# Patient Record
Sex: MALE | Race: BLACK OR AFRICAN AMERICAN | NOT HISPANIC OR LATINO | Employment: OTHER | URBAN - METROPOLITAN AREA
[De-identification: names, ages, dates, MRNs, and addresses within clinical notes are randomized per-mention and may not be internally consistent; named-entity substitution may affect disease eponyms.]

---

## 2018-03-26 LAB
ALBUMIN SERPL-MCNC: 4.3 G/DL (ref 3.2–4.8)
ALBUMIN/GLOB SERPL: 1 G/DL (ref 1.5–2.5)
ALP SERPL-CCNC: 79 U/L (ref 25–100)
ALT SERPL W P-5'-P-CCNC: 13 U/L (ref 7–40)
ANION GAP SERPL CALCULATED.3IONS-SCNC: 16 MMOL/L (ref 3–11)
AST SERPL-CCNC: 11 U/L (ref 0–33)
BASOPHILS # BLD AUTO: 0.04 10*3/MM3 (ref 0–0.2)
BASOPHILS NFR BLD AUTO: 0.3 % (ref 0–1)
BILIRUB SERPL-MCNC: 1.4 MG/DL (ref 0.3–1.2)
BUN BLD-MCNC: 13 MG/DL (ref 9–23)
BUN/CREAT SERPL: 6.8 (ref 7–25)
CALCIUM SPEC-SCNC: 10.5 MG/DL (ref 8.7–10.4)
CHLORIDE SERPL-SCNC: 95 MMOL/L (ref 99–109)
CO2 SERPL-SCNC: 20 MMOL/L (ref 20–31)
CREAT BLD-MCNC: 1.9 MG/DL (ref 0.6–1.3)
DEPRECATED RDW RBC AUTO: 47.6 FL (ref 37–54)
EOSINOPHIL # BLD AUTO: 0.01 10*3/MM3 (ref 0–0.3)
EOSINOPHIL NFR BLD AUTO: 0.1 % (ref 0–3)
ERYTHROCYTE [DISTWIDTH] IN BLOOD BY AUTOMATED COUNT: 15.7 % (ref 11.3–14.5)
GFR SERPL CREATININE-BSD FRML MDRD: 45 ML/MIN/1.73
GLOBULIN UR ELPH-MCNC: 4.1 GM/DL
GLUCOSE BLD-MCNC: 176 MG/DL (ref 70–100)
HCT VFR BLD AUTO: 46.2 % (ref 38.9–50.9)
HGB BLD-MCNC: 15.2 G/DL (ref 13.1–17.5)
HOLD SPECIMEN: NORMAL
HOLD SPECIMEN: NORMAL
IMM GRANULOCYTES # BLD: 0.11 10*3/MM3 (ref 0–0.03)
IMM GRANULOCYTES NFR BLD: 0.8 % (ref 0–0.6)
LIPASE SERPL-CCNC: 23 U/L (ref 6–51)
LYMPHOCYTES # BLD AUTO: 1.97 10*3/MM3 (ref 0.6–4.8)
LYMPHOCYTES NFR BLD AUTO: 14 % (ref 24–44)
MCH RBC QN AUTO: 27.3 PG (ref 27–31)
MCHC RBC AUTO-ENTMCNC: 32.9 G/DL (ref 32–36)
MCV RBC AUTO: 82.9 FL (ref 80–99)
MONOCYTES # BLD AUTO: 1.13 10*3/MM3 (ref 0–1)
MONOCYTES NFR BLD AUTO: 8 % (ref 0–12)
NEUTROPHILS # BLD AUTO: 10.79 10*3/MM3 (ref 1.5–8.3)
NEUTROPHILS NFR BLD AUTO: 76.8 % (ref 41–71)
PLATELET # BLD AUTO: 174 10*3/MM3 (ref 150–450)
PMV BLD AUTO: 9.6 FL (ref 6–12)
POTASSIUM BLD-SCNC: 4.2 MMOL/L (ref 3.5–5.5)
PROT SERPL-MCNC: 8.4 G/DL (ref 5.7–8.2)
RBC # BLD AUTO: 5.57 10*6/MM3 (ref 4.2–5.76)
SODIUM BLD-SCNC: 131 MMOL/L (ref 132–146)
WBC NRBC COR # BLD: 14.05 10*3/MM3 (ref 3.5–10.8)
WHOLE BLOOD HOLD SPECIMEN: NORMAL
WHOLE BLOOD HOLD SPECIMEN: NORMAL

## 2018-03-26 PROCEDURE — 96361 HYDRATE IV INFUSION ADD-ON: CPT

## 2018-03-26 PROCEDURE — 85025 COMPLETE CBC W/AUTO DIFF WBC: CPT | Performed by: EMERGENCY MEDICINE

## 2018-03-26 PROCEDURE — 83690 ASSAY OF LIPASE: CPT | Performed by: EMERGENCY MEDICINE

## 2018-03-26 PROCEDURE — 80053 COMPREHEN METABOLIC PANEL: CPT | Performed by: EMERGENCY MEDICINE

## 2018-03-26 PROCEDURE — 99284 EMERGENCY DEPT VISIT MOD MDM: CPT

## 2018-03-26 PROCEDURE — 96376 TX/PRO/DX INJ SAME DRUG ADON: CPT

## 2018-03-26 PROCEDURE — 96375 TX/PRO/DX INJ NEW DRUG ADDON: CPT

## 2018-03-26 PROCEDURE — 96374 THER/PROPH/DIAG INJ IV PUSH: CPT

## 2018-03-26 RX ORDER — SODIUM CHLORIDE 0.9 % (FLUSH) 0.9 %
10 SYRINGE (ML) INJECTION AS NEEDED
Status: DISCONTINUED | OUTPATIENT
Start: 2018-03-26 | End: 2018-03-29 | Stop reason: HOSPADM

## 2018-03-27 ENCOUNTER — HOSPITAL ENCOUNTER (OUTPATIENT)
Facility: HOSPITAL | Age: 58
Setting detail: OBSERVATION
Discharge: HOME | End: 2018-03-29
Attending: EMERGENCY MEDICINE | Admitting: INTERNAL MEDICINE

## 2018-03-27 ENCOUNTER — APPOINTMENT (OUTPATIENT)
Dept: CT IMAGING | Facility: HOSPITAL | Age: 58
End: 2018-03-27

## 2018-03-27 DIAGNOSIS — R11.2 NON-INTRACTABLE VOMITING WITH NAUSEA, UNSPECIFIED VOMITING TYPE: ICD-10-CM

## 2018-03-27 DIAGNOSIS — N17.9 AKI (ACUTE KIDNEY INJURY) (HCC): Primary | ICD-10-CM

## 2018-03-27 DIAGNOSIS — M1A.09X0 CHRONIC GOUT OF MULTIPLE SITES, UNSPECIFIED CAUSE: ICD-10-CM

## 2018-03-27 PROBLEM — R10.9 ABDOMINAL PAIN: Status: ACTIVE | Noted: 2018-03-27

## 2018-03-27 PROBLEM — R73.9 HYPERGLYCEMIA: Status: ACTIVE | Noted: 2018-03-27

## 2018-03-27 PROBLEM — D72.829 LEUKOCYTOSIS: Status: ACTIVE | Noted: 2018-03-27

## 2018-03-27 LAB
ALBUMIN SERPL-MCNC: 4 G/DL (ref 3.2–4.8)
ALBUMIN/GLOB SERPL: 1.1 G/DL (ref 1.5–2.5)
ALP SERPL-CCNC: 64 U/L (ref 25–100)
ALT SERPL W P-5'-P-CCNC: 15 U/L (ref 7–40)
ANION GAP SERPL CALCULATED.3IONS-SCNC: 10 MMOL/L (ref 3–11)
AST SERPL-CCNC: 11 U/L (ref 0–33)
BACTERIA UR QL AUTO: ABNORMAL /HPF
BASOPHILS # BLD AUTO: 0.03 10*3/MM3 (ref 0–0.2)
BASOPHILS NFR BLD AUTO: 0.2 % (ref 0–1)
BILIRUB SERPL-MCNC: 1 MG/DL (ref 0.3–1.2)
BILIRUB UR QL STRIP: NEGATIVE
BUN BLD-MCNC: 14 MG/DL (ref 9–23)
BUN/CREAT SERPL: 7.8 (ref 7–25)
CALCIUM SPEC-SCNC: 9.4 MG/DL (ref 8.7–10.4)
CHLORIDE SERPL-SCNC: 98 MMOL/L (ref 99–109)
CLARITY UR: ABNORMAL
CO2 SERPL-SCNC: 24 MMOL/L (ref 20–31)
COLOR UR: YELLOW
CREAT BLD-MCNC: 1.8 MG/DL (ref 0.6–1.3)
D-LACTATE SERPL-SCNC: 0.9 MMOL/L (ref 0.5–2)
D-LACTATE SERPL-SCNC: 2.3 MMOL/L (ref 0.5–2)
DEPRECATED RDW RBC AUTO: 48.5 FL (ref 37–54)
EOSINOPHIL # BLD AUTO: 0.13 10*3/MM3 (ref 0–0.3)
EOSINOPHIL NFR BLD AUTO: 1.1 % (ref 0–3)
ERYTHROCYTE [DISTWIDTH] IN BLOOD BY AUTOMATED COUNT: 15.7 % (ref 11.3–14.5)
GFR SERPL CREATININE-BSD FRML MDRD: 47 ML/MIN/1.73
GLOBULIN UR ELPH-MCNC: 3.5 GM/DL
GLUCOSE BLD-MCNC: 121 MG/DL (ref 70–100)
GLUCOSE UR STRIP-MCNC: NEGATIVE MG/DL
HCT VFR BLD AUTO: 42.1 % (ref 38.9–50.9)
HGB BLD-MCNC: 13.1 G/DL (ref 13.1–17.5)
HGB UR QL STRIP.AUTO: ABNORMAL
HOLD SPECIMEN: NORMAL
HYALINE CASTS UR QL AUTO: ABNORMAL /LPF
IMM GRANULOCYTES # BLD: 0.08 10*3/MM3 (ref 0–0.03)
IMM GRANULOCYTES NFR BLD: 0.7 % (ref 0–0.6)
KETONES UR QL STRIP: ABNORMAL
LEUKOCYTE ESTERASE UR QL STRIP.AUTO: NEGATIVE
LYMPHOCYTES # BLD AUTO: 2.83 10*3/MM3 (ref 0.6–4.8)
LYMPHOCYTES NFR BLD AUTO: 23 % (ref 24–44)
MCH RBC QN AUTO: 26.2 PG (ref 27–31)
MCHC RBC AUTO-ENTMCNC: 31.1 G/DL (ref 32–36)
MCV RBC AUTO: 84.2 FL (ref 80–99)
MONOCYTES # BLD AUTO: 1.25 10*3/MM3 (ref 0–1)
MONOCYTES NFR BLD AUTO: 10.2 % (ref 0–12)
NEUTROPHILS # BLD AUTO: 7.97 10*3/MM3 (ref 1.5–8.3)
NEUTROPHILS NFR BLD AUTO: 64.8 % (ref 41–71)
NITRITE UR QL STRIP: NEGATIVE
PH UR STRIP.AUTO: 5.5 [PH] (ref 5–8)
PLATELET # BLD AUTO: 172 10*3/MM3 (ref 150–450)
PMV BLD AUTO: 10.2 FL (ref 6–12)
POTASSIUM BLD-SCNC: 3.9 MMOL/L (ref 3.5–5.5)
PROT SERPL-MCNC: 7.5 G/DL (ref 5.7–8.2)
PROT UR QL STRIP: ABNORMAL
RBC # BLD AUTO: 5 10*6/MM3 (ref 4.2–5.76)
RBC # UR: ABNORMAL /HPF
REF LAB TEST METHOD: ABNORMAL
RENAL EPI CELLS #/AREA URNS HPF: ABNORMAL /HPF
SODIUM BLD-SCNC: 132 MMOL/L (ref 132–146)
SP GR UR STRIP: 1.01 (ref 1–1.03)
SQUAMOUS #/AREA URNS HPF: ABNORMAL /HPF
TRANS CELLS #/AREA URNS HPF: ABNORMAL /HPF
TROPONIN I SERPL-MCNC: 0 NG/ML (ref 0–0.07)
UROBILINOGEN UR QL STRIP: ABNORMAL
WBC NRBC COR # BLD: 12.29 10*3/MM3 (ref 3.5–10.8)
WBC UR QL AUTO: ABNORMAL /HPF

## 2018-03-27 PROCEDURE — 87086 URINE CULTURE/COLONY COUNT: CPT | Performed by: EMERGENCY MEDICINE

## 2018-03-27 PROCEDURE — 96361 HYDRATE IV INFUSION ADD-ON: CPT

## 2018-03-27 PROCEDURE — 25010000002 FENTANYL CITRATE (PF) 100 MCG/2ML SOLUTION: Performed by: EMERGENCY MEDICINE

## 2018-03-27 PROCEDURE — G0378 HOSPITAL OBSERVATION PER HR: HCPCS

## 2018-03-27 PROCEDURE — 80053 COMPREHEN METABOLIC PANEL: CPT | Performed by: INTERNAL MEDICINE

## 2018-03-27 PROCEDURE — 74176 CT ABD & PELVIS W/O CONTRAST: CPT

## 2018-03-27 PROCEDURE — 84484 ASSAY OF TROPONIN QUANT: CPT

## 2018-03-27 PROCEDURE — 0 DIATRIZOATE MEGLUMINE & SODIUM PER 1 ML: Performed by: EMERGENCY MEDICINE

## 2018-03-27 PROCEDURE — 96375 TX/PRO/DX INJ NEW DRUG ADDON: CPT

## 2018-03-27 PROCEDURE — 25010000002 ONDANSETRON PER 1 MG: Performed by: EMERGENCY MEDICINE

## 2018-03-27 PROCEDURE — 83605 ASSAY OF LACTIC ACID: CPT | Performed by: EMERGENCY MEDICINE

## 2018-03-27 PROCEDURE — 25010000002 ENOXAPARIN PER 10 MG: Performed by: INTERNAL MEDICINE

## 2018-03-27 PROCEDURE — 99220 PR INITIAL OBSERVATION CARE/DAY 70 MINUTES: CPT | Performed by: INTERNAL MEDICINE

## 2018-03-27 PROCEDURE — 96374 THER/PROPH/DIAG INJ IV PUSH: CPT

## 2018-03-27 PROCEDURE — 81001 URINALYSIS AUTO W/SCOPE: CPT | Performed by: EMERGENCY MEDICINE

## 2018-03-27 PROCEDURE — 96372 THER/PROPH/DIAG INJ SC/IM: CPT

## 2018-03-27 PROCEDURE — 85025 COMPLETE CBC W/AUTO DIFF WBC: CPT | Performed by: INTERNAL MEDICINE

## 2018-03-27 PROCEDURE — 63710000001 PREDNISONE PER 1 MG: Performed by: INTERNAL MEDICINE

## 2018-03-27 PROCEDURE — 96376 TX/PRO/DX INJ SAME DRUG ADON: CPT

## 2018-03-27 RX ORDER — SODIUM CHLORIDE 0.9 % (FLUSH) 0.9 %
1-10 SYRINGE (ML) INJECTION AS NEEDED
Status: DISCONTINUED | OUTPATIENT
Start: 2018-03-27 | End: 2018-03-29 | Stop reason: HOSPADM

## 2018-03-27 RX ORDER — FEBUXOSTAT 40 MG/1
40 TABLET, FILM COATED ORAL DAILY
COMMUNITY
End: 2018-12-13 | Stop reason: SDUPTHER

## 2018-03-27 RX ORDER — COLCHICINE 0.6 MG/1
0.6 TABLET ORAL DAILY
Status: DISCONTINUED | OUTPATIENT
Start: 2018-03-27 | End: 2018-03-29 | Stop reason: HOSPADM

## 2018-03-27 RX ORDER — ONDANSETRON 2 MG/ML
4 INJECTION INTRAMUSCULAR; INTRAVENOUS EVERY 6 HOURS PRN
Status: DISCONTINUED | OUTPATIENT
Start: 2018-03-27 | End: 2018-03-29 | Stop reason: HOSPADM

## 2018-03-27 RX ORDER — ACETAMINOPHEN 325 MG/1
650 TABLET ORAL EVERY 4 HOURS PRN
Status: DISCONTINUED | OUTPATIENT
Start: 2018-03-27 | End: 2018-03-29 | Stop reason: HOSPADM

## 2018-03-27 RX ORDER — FENTANYL CITRATE 50 UG/ML
50 INJECTION, SOLUTION INTRAMUSCULAR; INTRAVENOUS
Status: DISCONTINUED | OUTPATIENT
Start: 2018-03-27 | End: 2018-03-29 | Stop reason: HOSPADM

## 2018-03-27 RX ORDER — ONDANSETRON 4 MG/1
4 TABLET, FILM COATED ORAL EVERY 6 HOURS PRN
Status: DISCONTINUED | OUTPATIENT
Start: 2018-03-27 | End: 2018-03-29 | Stop reason: HOSPADM

## 2018-03-27 RX ORDER — PREDNISONE 20 MG/1
20 TABLET ORAL
Status: DISCONTINUED | OUTPATIENT
Start: 2018-03-27 | End: 2018-03-29 | Stop reason: HOSPADM

## 2018-03-27 RX ORDER — PREDNISONE 10 MG/1
10 TABLET ORAL DAILY
COMMUNITY
End: 2018-05-27

## 2018-03-27 RX ORDER — FEBUXOSTAT 40 MG/1
80 TABLET, FILM COATED ORAL DAILY
Status: DISCONTINUED | OUTPATIENT
Start: 2018-03-27 | End: 2018-03-29 | Stop reason: HOSPADM

## 2018-03-27 RX ORDER — HYDROCODONE BITARTRATE AND ACETAMINOPHEN 5; 325 MG/1; MG/1
1 TABLET ORAL EVERY 6 HOURS PRN
Status: DISCONTINUED | OUTPATIENT
Start: 2018-03-27 | End: 2018-03-29 | Stop reason: HOSPADM

## 2018-03-27 RX ORDER — SODIUM CHLORIDE 450 MG/100ML
125 INJECTION, SOLUTION INTRAVENOUS CONTINUOUS
Status: DISCONTINUED | OUTPATIENT
Start: 2018-03-27 | End: 2018-03-29 | Stop reason: HOSPADM

## 2018-03-27 RX ORDER — PREDNISONE 10 MG/1
10 TABLET ORAL DAILY
Status: DISCONTINUED | OUTPATIENT
Start: 2018-03-27 | End: 2018-03-27

## 2018-03-27 RX ORDER — FEBUXOSTAT 40 MG/1
40 TABLET, FILM COATED ORAL DAILY
Status: DISCONTINUED | OUTPATIENT
Start: 2018-03-27 | End: 2018-03-29 | Stop reason: HOSPADM

## 2018-03-27 RX ORDER — ONDANSETRON 2 MG/ML
4 INJECTION INTRAMUSCULAR; INTRAVENOUS ONCE
Status: COMPLETED | OUTPATIENT
Start: 2018-03-27 | End: 2018-03-27

## 2018-03-27 RX ORDER — FEBUXOSTAT 80 MG/1
80 TABLET, FILM COATED ORAL DAILY
COMMUNITY
End: 2018-12-13 | Stop reason: SDUPTHER

## 2018-03-27 RX ORDER — COLCHICINE 0.6 MG/1
0.6 TABLET ORAL EVERY OTHER DAY
COMMUNITY
End: 2018-05-31 | Stop reason: HOSPADM

## 2018-03-27 RX ADMIN — ONDANSETRON 4 MG: 2 INJECTION INTRAMUSCULAR; INTRAVENOUS at 01:21

## 2018-03-27 RX ADMIN — FEBUXOSTAT 80 MG: 40 TABLET ORAL at 11:19

## 2018-03-27 RX ADMIN — PREDNISONE 20 MG: 20 TABLET ORAL at 11:19

## 2018-03-27 RX ADMIN — FENTANYL CITRATE 50 MCG: 50 INJECTION, SOLUTION INTRAMUSCULAR; INTRAVENOUS at 01:22

## 2018-03-27 RX ADMIN — COLCHICINE 0.6 MG: 0.6 TABLET, FILM COATED ORAL at 11:19

## 2018-03-27 RX ADMIN — SODIUM CHLORIDE 1000 ML: 9 INJECTION, SOLUTION INTRAVENOUS at 01:20

## 2018-03-27 RX ADMIN — FENTANYL CITRATE 50 MCG: 50 INJECTION, SOLUTION INTRAMUSCULAR; INTRAVENOUS at 03:26

## 2018-03-27 RX ADMIN — ENOXAPARIN SODIUM 40 MG: 40 INJECTION SUBCUTANEOUS at 11:19

## 2018-03-27 RX ADMIN — DIATRIZOATE MEGLUMINE AND DIATRIZOATE SODIUM 15 ML: 660; 100 LIQUID ORAL; RECTAL at 01:24

## 2018-03-27 RX ADMIN — SODIUM CHLORIDE 125 ML/HR: 4.5 INJECTION, SOLUTION INTRAVENOUS at 07:04

## 2018-03-27 RX ADMIN — SODIUM CHLORIDE 125 ML/HR: 4.5 INJECTION, SOLUTION INTRAVENOUS at 16:01

## 2018-03-27 RX ADMIN — FEBUXOSTAT 40 MG: 40 TABLET ORAL at 13:40

## 2018-03-27 RX ADMIN — HYDROCODONE BITARTRATE AND ACETAMINOPHEN 1 TABLET: 5; 325 TABLET ORAL at 18:38

## 2018-03-28 LAB
ALBUMIN SERPL-MCNC: 3.7 G/DL (ref 3.2–4.8)
ALBUMIN/GLOB SERPL: 1.2 G/DL (ref 1.5–2.5)
ALP SERPL-CCNC: 54 U/L (ref 25–100)
ALT SERPL W P-5'-P-CCNC: 17 U/L (ref 7–40)
ANION GAP SERPL CALCULATED.3IONS-SCNC: 8 MMOL/L (ref 3–11)
AST SERPL-CCNC: 16 U/L (ref 0–33)
BASOPHILS # BLD AUTO: 0.02 10*3/MM3 (ref 0–0.2)
BASOPHILS NFR BLD AUTO: 0.3 % (ref 0–1)
BILIRUB SERPL-MCNC: 0.5 MG/DL (ref 0.3–1.2)
BUN BLD-MCNC: 12 MG/DL (ref 9–23)
BUN/CREAT SERPL: 8.6 (ref 7–25)
CALCIUM SPEC-SCNC: 8.9 MG/DL (ref 8.7–10.4)
CHLORIDE SERPL-SCNC: 100 MMOL/L (ref 99–109)
CO2 SERPL-SCNC: 26 MMOL/L (ref 20–31)
CREAT BLD-MCNC: 1.4 MG/DL (ref 0.6–1.3)
DEPRECATED RDW RBC AUTO: 49 FL (ref 37–54)
EOSINOPHIL # BLD AUTO: 0.11 10*3/MM3 (ref 0–0.3)
EOSINOPHIL NFR BLD AUTO: 1.4 % (ref 0–3)
ERYTHROCYTE [DISTWIDTH] IN BLOOD BY AUTOMATED COUNT: 15.6 % (ref 11.3–14.5)
GFR SERPL CREATININE-BSD FRML MDRD: 63 ML/MIN/1.73
GLOBULIN UR ELPH-MCNC: 3.1 GM/DL
GLUCOSE BLD-MCNC: 94 MG/DL (ref 70–100)
HBA1C MFR BLD: 6.6 % (ref 4.8–5.6)
HCT VFR BLD AUTO: 37.3 % (ref 38.9–50.9)
HGB BLD-MCNC: 11.4 G/DL (ref 13.1–17.5)
IMM GRANULOCYTES # BLD: 0.03 10*3/MM3 (ref 0–0.03)
IMM GRANULOCYTES NFR BLD: 0.4 % (ref 0–0.6)
LYMPHOCYTES # BLD AUTO: 2.02 10*3/MM3 (ref 0.6–4.8)
LYMPHOCYTES NFR BLD AUTO: 25.5 % (ref 24–44)
MCH RBC QN AUTO: 26.1 PG (ref 27–31)
MCHC RBC AUTO-ENTMCNC: 30.6 G/DL (ref 32–36)
MCV RBC AUTO: 85.4 FL (ref 80–99)
MONOCYTES # BLD AUTO: 0.62 10*3/MM3 (ref 0–1)
MONOCYTES NFR BLD AUTO: 7.8 % (ref 0–12)
NEUTROPHILS # BLD AUTO: 5.11 10*3/MM3 (ref 1.5–8.3)
NEUTROPHILS NFR BLD AUTO: 64.6 % (ref 41–71)
PLATELET # BLD AUTO: 169 10*3/MM3 (ref 150–450)
PMV BLD AUTO: 9.9 FL (ref 6–12)
POTASSIUM BLD-SCNC: 4.2 MMOL/L (ref 3.5–5.5)
PROT SERPL-MCNC: 6.8 G/DL (ref 5.7–8.2)
RBC # BLD AUTO: 4.37 10*6/MM3 (ref 4.2–5.76)
SODIUM BLD-SCNC: 134 MMOL/L (ref 132–146)
WBC NRBC COR # BLD: 7.91 10*3/MM3 (ref 3.5–10.8)

## 2018-03-28 PROCEDURE — 80053 COMPREHEN METABOLIC PANEL: CPT | Performed by: INTERNAL MEDICINE

## 2018-03-28 PROCEDURE — 96361 HYDRATE IV INFUSION ADD-ON: CPT

## 2018-03-28 PROCEDURE — 96372 THER/PROPH/DIAG INJ SC/IM: CPT

## 2018-03-28 PROCEDURE — 83036 HEMOGLOBIN GLYCOSYLATED A1C: CPT | Performed by: INTERNAL MEDICINE

## 2018-03-28 PROCEDURE — G0378 HOSPITAL OBSERVATION PER HR: HCPCS

## 2018-03-28 PROCEDURE — 63710000001 PREDNISONE PER 1 MG: Performed by: INTERNAL MEDICINE

## 2018-03-28 PROCEDURE — 85025 COMPLETE CBC W/AUTO DIFF WBC: CPT | Performed by: INTERNAL MEDICINE

## 2018-03-28 PROCEDURE — 25010000002 ENOXAPARIN PER 10 MG: Performed by: INTERNAL MEDICINE

## 2018-03-28 PROCEDURE — 99225 PR SBSQ OBSERVATION CARE/DAY 25 MINUTES: CPT | Performed by: INTERNAL MEDICINE

## 2018-03-28 RX ADMIN — PREDNISONE 20 MG: 20 TABLET ORAL at 08:16

## 2018-03-28 RX ADMIN — COLCHICINE 0.6 MG: 0.6 TABLET, FILM COATED ORAL at 08:16

## 2018-03-28 RX ADMIN — SODIUM CHLORIDE 125 ML/HR: 4.5 INJECTION, SOLUTION INTRAVENOUS at 05:08

## 2018-03-28 RX ADMIN — FEBUXOSTAT 40 MG: 40 TABLET ORAL at 08:17

## 2018-03-28 RX ADMIN — FEBUXOSTAT 80 MG: 40 TABLET ORAL at 08:18

## 2018-03-28 RX ADMIN — ENOXAPARIN SODIUM 40 MG: 40 INJECTION SUBCUTANEOUS at 05:08

## 2018-03-29 VITALS
TEMPERATURE: 98.1 F | DIASTOLIC BLOOD PRESSURE: 88 MMHG | HEART RATE: 77 BPM | HEIGHT: 74 IN | RESPIRATION RATE: 18 BRPM | OXYGEN SATURATION: 96 % | WEIGHT: 260 LBS | BODY MASS INDEX: 33.37 KG/M2 | SYSTOLIC BLOOD PRESSURE: 122 MMHG

## 2018-03-29 LAB — BACTERIA SPEC AEROBE CULT: NORMAL

## 2018-03-29 PROCEDURE — 96372 THER/PROPH/DIAG INJ SC/IM: CPT

## 2018-03-29 PROCEDURE — 63710000001 PREDNISONE PER 1 MG: Performed by: INTERNAL MEDICINE

## 2018-03-29 PROCEDURE — G0378 HOSPITAL OBSERVATION PER HR: HCPCS

## 2018-03-29 PROCEDURE — 99217 PR OBSERVATION CARE DISCHARGE MANAGEMENT: CPT | Performed by: NURSE PRACTITIONER

## 2018-03-29 PROCEDURE — 25010000002 ENOXAPARIN PER 10 MG: Performed by: INTERNAL MEDICINE

## 2018-03-29 RX ADMIN — HYDROCODONE BITARTRATE AND ACETAMINOPHEN 1 TABLET: 5; 325 TABLET ORAL at 07:55

## 2018-03-29 RX ADMIN — ENOXAPARIN SODIUM 40 MG: 40 INJECTION SUBCUTANEOUS at 05:19

## 2018-03-29 RX ADMIN — FEBUXOSTAT 80 MG: 40 TABLET ORAL at 07:51

## 2018-03-29 RX ADMIN — COLCHICINE 0.6 MG: 0.6 TABLET, FILM COATED ORAL at 07:51

## 2018-03-29 RX ADMIN — FEBUXOSTAT 40 MG: 40 TABLET ORAL at 07:53

## 2018-03-29 RX ADMIN — PREDNISONE 20 MG: 20 TABLET ORAL at 07:53

## 2018-03-30 ENCOUNTER — TRANSITIONAL CARE MANAGEMENT TELEPHONE ENCOUNTER (OUTPATIENT)
Dept: INTERNAL MEDICINE | Facility: CLINIC | Age: 58
End: 2018-03-30

## 2018-05-08 ENCOUNTER — OFFICE VISIT (OUTPATIENT)
Dept: INTERNAL MEDICINE | Facility: CLINIC | Age: 58
End: 2018-05-08

## 2018-05-08 VITALS
WEIGHT: 270 LBS | HEART RATE: 117 BPM | OXYGEN SATURATION: 99 % | TEMPERATURE: 97.2 F | SYSTOLIC BLOOD PRESSURE: 148 MMHG | BODY MASS INDEX: 34.65 KG/M2 | RESPIRATION RATE: 22 BRPM | DIASTOLIC BLOOD PRESSURE: 92 MMHG | HEIGHT: 74 IN

## 2018-05-08 DIAGNOSIS — Z00.00 ANNUAL PHYSICAL EXAM: ICD-10-CM

## 2018-05-08 DIAGNOSIS — Z13.89 ENCOUNTER FOR SCREENING FOR OTHER DISORDER: ICD-10-CM

## 2018-05-08 DIAGNOSIS — Z11.59 NEED FOR HEPATITIS C SCREENING TEST: ICD-10-CM

## 2018-05-08 DIAGNOSIS — M1A.09X1 IDIOPATHIC CHRONIC GOUT OF MULTIPLE SITES WITH TOPHUS: ICD-10-CM

## 2018-05-08 DIAGNOSIS — Z12.11 ENCOUNTER FOR SCREENING COLONOSCOPY: ICD-10-CM

## 2018-05-08 DIAGNOSIS — M1A.9XX1 CHRONIC TOPHACEOUS GOUT: Primary | ICD-10-CM

## 2018-05-08 DIAGNOSIS — Z13.220 SCREENING FOR LIPOID DISORDERS: ICD-10-CM

## 2018-05-08 DIAGNOSIS — Z13.29 SCREENING FOR ENDOCRINE DISORDER: ICD-10-CM

## 2018-05-08 PROCEDURE — 99214 OFFICE O/P EST MOD 30 MIN: CPT | Performed by: NURSE PRACTITIONER

## 2018-05-08 PROCEDURE — 36415 COLL VENOUS BLD VENIPUNCTURE: CPT | Performed by: NURSE PRACTITIONER

## 2018-05-08 RX ORDER — OMEPRAZOLE 20 MG/1
20 CAPSULE, DELAYED RELEASE ORAL DAILY
Refills: 0 | COMMUNITY
Start: 2018-05-02 | End: 2018-12-13

## 2018-05-08 NOTE — PROGRESS NOTES
Chief Complaint   Patient presents with   • Gout     pt states cannot get shoe on, very painful, believes may be gout. Also very sick to stomach, c vomiting.       Subjective   Physical and establish care. Patient had cancelled [facility] visit.    History of Present Illness   Physical and establish care. Patient states his PCP retired and he needed new one after being in hospital in 3/2018 and requests referral to rheumatology.  All Collins is a 57 y.o. male.     Are you currently seeing any other doctors or specialists?  None. States he has been in hospital in 3/2018  Are you currently taking any OTC medications or herbal medications?   No  Sleep: 5-6 hours/night  Diet: Whatever he wants  Exercise: No regular exercise    Most recent colonoscopy: 4-5 years ago  Regular dental visits: Not current  Regular eye exams: Not current    The following portions of the patient's history were reviewed and updated as appropriate: allergies, current medications, past family history, past medical history, past social history, past surgical history and problem list.    No Known Allergies  Social History   Substance Use Topics   • Smoking status: Never Smoker   • Smokeless tobacco: Never Used   • Alcohol use Yes      Comment: 3 per week   3 beers  Past Surgical History:   Procedure Laterality Date   • HAND SURGERY     • KNEE SURGERY       No family history on file.      Current Outpatient Prescriptions:   •  colchicine 0.6 MG tablet, Take 0.6 mg by mouth Every Other Day., Disp: , Rfl:   •  febuxostat (ULORIC) 40 MG tablet, Take 40 mg by mouth Daily. Total of 120mg daily, Disp: , Rfl:   •  febuxostat (ULORIC) 80 MG tablet tablet, Take 80 mg by mouth Daily. Total of 120mg daily, Disp: , Rfl:   •  omeprazole (priLOSEC) 20 MG capsule, , Disp: , Rfl: 0  •  predniSONE (DELTASONE) 10 MG tablet, Take 10 mg by mouth Daily. Chronically takes prednisone 10mg daily  Current taper starting 3/26:  20mg daily x 7 days; 15mg daily x 7 days, then  back to baseline dose, Disp: , Rfl:     Patient Active Problem List   Diagnosis   • Abdominal pain   • N&V (nausea and vomiting)   • Leukocytosis   • Hyperglycemia       Review of Systems   Constitutional: Negative.    HENT: Negative.    Eyes: Negative.    Respiratory: Negative.    Cardiovascular: Negative.    Gastrointestinal: Positive for nausea, vomiting and GERD.   Endocrine: Negative.    Genitourinary: Negative.    Musculoskeletal:        Gout since age 18 uncontrolled with large tophi   Skin: Negative.    Allergic/Immunologic: Negative.    Neurological: Negative.    Hematological: Negative.    Psychiatric/Behavioral: Negative.        Objective   Vitals:    05/08/18 1336   BP: 148/92   Pulse: 117   Resp: 22   Temp: 97.2 °F (36.2 °C)   SpO2: 99%     Physical Exam   Constitutional: He is oriented to person, place, and time. He appears well-developed and well-nourished. He is cooperative. He is easily aroused.  Non-toxic appearance. He does not have a sickly appearance. He does not appear ill. No distress. He is obese.  HENT:   Head: Normocephalic and atraumatic. Head is without abrasion. Hair is normal.   Right Ear: Hearing, tympanic membrane, external ear and ear canal normal. No foreign bodies. Tympanic membrane is not perforated and not erythematous.   Left Ear: Hearing, tympanic membrane, external ear and ear canal normal. No foreign bodies. Tympanic membrane is not perforated and not erythematous.   Nose: Nose normal. No mucosal edema, rhinorrhea or septal deviation. No epistaxis.  No foreign bodies.   Mouth/Throat: Oropharynx is clear and moist and mucous membranes are normal. No oral lesions. Normal dentition.   Eyes: Lids are normal. Pupils are equal, round, and reactive to light. Right eye exhibits no discharge. Left eye exhibits no discharge. Right conjunctiva is not injected. Left conjunctiva is not injected. No scleral icterus.   Neck: Normal range of motion and full passive range of motion without  pain. Neck supple. No edema and normal range of motion present. No thyroid mass and no thyromegaly present.   Cardiovascular: Normal rate, regular rhythm and normal heart sounds.  Exam reveals no gallop and no friction rub.    No murmur heard.  Pulmonary/Chest: Effort normal and breath sounds normal. No accessory muscle usage. He has no rhonchi. He has no rales. He exhibits no tenderness.   Abdominal: Soft. Bowel sounds are normal. He exhibits no distension. There is no hepatosplenomegaly or hepatomegaly. There is no tenderness. There is no CVA tenderness.   Musculoskeletal: He exhibits no edema, tenderness or deformity.   Walking with crutches. Unable to bear weight on right foot. Large tophi on bilateral hands, elbows and feet. Hands and feet have deformities with multiple tophi and misalignment of fingers.   Lymphadenopathy:     He has no cervical adenopathy.   Neurological: He is alert, oriented to person, place, and time and easily aroused. He has normal reflexes. No cranial nerve deficit. Coordination normal.   Skin: Skin is warm, dry and intact. No abrasion and no rash noted. He is not diaphoretic. No cyanosis or erythema. Nails show no clubbing.   Psychiatric: His mood appears anxious.   In obvious pain with movement.   Nursing note and vitals reviewed.      Results for orders placed or performed during the hospital encounter of 03/27/18   Urine Culture - Urine, Urine, Clean Catch   Result Value Ref Range    Urine Culture No growth at 2 days    Comprehensive Metabolic Panel   Result Value Ref Range    Glucose 176 (H) 70 - 100 mg/dL    BUN 13 9 - 23 mg/dL    Creatinine 1.90 (H) 0.60 - 1.30 mg/dL    Sodium 131 (L) 132 - 146 mmol/L    Potassium 4.2 3.5 - 5.5 mmol/L    Chloride 95 (L) 99 - 109 mmol/L    CO2 20.0 20.0 - 31.0 mmol/L    Calcium 10.5 (H) 8.7 - 10.4 mg/dL    Total Protein 8.4 (H) 5.7 - 8.2 g/dL    Albumin 4.30 3.20 - 4.80 g/dL    ALT (SGPT) 13 7 - 40 U/L    AST (SGOT) 11 0 - 33 U/L    Alkaline  Phosphatase 79 25 - 100 U/L    Total Bilirubin 1.4 (H) 0.3 - 1.2 mg/dL    eGFR  African Amer 45 (L) >60 mL/min/1.73    Globulin 4.1 gm/dL    A/G Ratio 1.0 (L) 1.5 - 2.5 g/dL    BUN/Creatinine Ratio 6.8 (L) 7.0 - 25.0    Anion Gap 16.0 (H) 3.0 - 11.0 mmol/L   Lipase   Result Value Ref Range    Lipase 23 6 - 51 U/L   Urinalysis With / Culture If Indicated - Urine, Clean Catch   Result Value Ref Range    Color, UA Yellow Yellow, Straw    Appearance, UA Cloudy (A) Clear    pH, UA 5.5 5.0 - 8.0    Specific Gravity, UA 1.012 1.001 - 1.030    Glucose, UA Negative Negative    Ketones, UA Trace (A) Negative    Bilirubin, UA Negative Negative    Blood, UA Moderate (2+) (A) Negative    Protein, UA 30 mg/dL (1+) (A) Negative    Leuk Esterase, UA Negative Negative    Nitrite, UA Negative Negative    Urobilinogen, UA 1.0 E.U./dL 0.2 - 1.0 E.U./dL   CBC Auto Differential   Result Value Ref Range    WBC 14.05 (H) 3.50 - 10.80 10*3/mm3    RBC 5.57 4.20 - 5.76 10*6/mm3    Hemoglobin 15.2 13.1 - 17.5 g/dL    Hematocrit 46.2 38.9 - 50.9 %    MCV 82.9 80.0 - 99.0 fL    MCH 27.3 27.0 - 31.0 pg    MCHC 32.9 32.0 - 36.0 g/dL    RDW 15.7 (H) 11.3 - 14.5 %    RDW-SD 47.6 37.0 - 54.0 fl    MPV 9.6 6.0 - 12.0 fL    Platelets 174 150 - 450 10*3/mm3    Neutrophil % 76.8 (H) 41.0 - 71.0 %    Lymphocyte % 14.0 (L) 24.0 - 44.0 %    Monocyte % 8.0 0.0 - 12.0 %    Eosinophil % 0.1 0.0 - 3.0 %    Basophil % 0.3 0.0 - 1.0 %    Immature Grans % 0.8 (H) 0.0 - 0.6 %    Neutrophils, Absolute 10.79 (H) 1.50 - 8.30 10*3/mm3    Lymphocytes, Absolute 1.97 0.60 - 4.80 10*3/mm3    Monocytes, Absolute 1.13 (H) 0.00 - 1.00 10*3/mm3    Eosinophils, Absolute 0.01 0.00 - 0.30 10*3/mm3    Basophils, Absolute 0.04 0.00 - 0.20 10*3/mm3    Immature Grans, Absolute 0.11 (H) 0.00 - 0.03 10*3/mm3   Lactic Acid, Plasma   Result Value Ref Range    Lactate 2.3 (C) 0.5 - 2.0 mmol/L   Lactic Acid, Reflex Timer (This will reflex a repeat order 3-3:15 hours after ordered.)    Result Value Ref Range    Extra Tube Hold for add-ons.    Urinalysis, Microscopic Only - Urine, Clean Catch   Result Value Ref Range    RBC, UA 13-20 (A) None Seen, 0-2 /HPF    WBC, UA 6-12 (A) None Seen, 0-2 /HPF    Bacteria, UA None Seen None Seen, Trace /HPF    Squamous Epithelial Cells, UA 3-6 (A) None Seen, 0-2 /HPF    Transitional Epithelial Cells, UA 0-2 0 - 2 /HPF    Renal Epithelial Cells, UA 0-2 0 - 2 /HPF    Hyaline Casts, UA 0-6 0 - 6 /LPF    Methodology Manual Light Microscopy    Lactic Acid, Reflex   Result Value Ref Range    Lactate 0.9 0.5 - 2.0 mmol/L   Comprehensive Metabolic Panel   Result Value Ref Range    Glucose 121 (H) 70 - 100 mg/dL    BUN 14 9 - 23 mg/dL    Creatinine 1.80 (H) 0.60 - 1.30 mg/dL    Sodium 132 132 - 146 mmol/L    Potassium 3.9 3.5 - 5.5 mmol/L    Chloride 98 (L) 99 - 109 mmol/L    CO2 24.0 20.0 - 31.0 mmol/L    Calcium 9.4 8.7 - 10.4 mg/dL    Total Protein 7.5 5.7 - 8.2 g/dL    Albumin 4.00 3.20 - 4.80 g/dL    ALT (SGPT) 15 7 - 40 U/L    AST (SGOT) 11 0 - 33 U/L    Alkaline Phosphatase 64 25 - 100 U/L    Total Bilirubin 1.0 0.3 - 1.2 mg/dL    eGFR  African Amer 47 (L) >60 mL/min/1.73    Globulin 3.5 gm/dL    A/G Ratio 1.1 (L) 1.5 - 2.5 g/dL    BUN/Creatinine Ratio 7.8 7.0 - 25.0    Anion Gap 10.0 3.0 - 11.0 mmol/L   CBC Auto Differential   Result Value Ref Range    WBC 12.29 (H) 3.50 - 10.80 10*3/mm3    RBC 5.00 4.20 - 5.76 10*6/mm3    Hemoglobin 13.1 13.1 - 17.5 g/dL    Hematocrit 42.1 38.9 - 50.9 %    MCV 84.2 80.0 - 99.0 fL    MCH 26.2 (L) 27.0 - 31.0 pg    MCHC 31.1 (L) 32.0 - 36.0 g/dL    RDW 15.7 (H) 11.3 - 14.5 %    RDW-SD 48.5 37.0 - 54.0 fl    MPV 10.2 6.0 - 12.0 fL    Platelets 172 150 - 450 10*3/mm3    Neutrophil % 64.8 41.0 - 71.0 %    Lymphocyte % 23.0 (L) 24.0 - 44.0 %    Monocyte % 10.2 0.0 - 12.0 %    Eosinophil % 1.1 0.0 - 3.0 %    Basophil % 0.2 0.0 - 1.0 %    Immature Grans % 0.7 (H) 0.0 - 0.6 %    Neutrophils, Absolute 7.97 1.50 - 8.30 10*3/mm3     Lymphocytes, Absolute 2.83 0.60 - 4.80 10*3/mm3    Monocytes, Absolute 1.25 (H) 0.00 - 1.00 10*3/mm3    Eosinophils, Absolute 0.13 0.00 - 0.30 10*3/mm3    Basophils, Absolute 0.03 0.00 - 0.20 10*3/mm3    Immature Grans, Absolute 0.08 (H) 0.00 - 0.03 10*3/mm3   CBC Auto Differential   Result Value Ref Range    WBC 7.91 3.50 - 10.80 10*3/mm3    RBC 4.37 4.20 - 5.76 10*6/mm3    Hemoglobin 11.4 (L) 13.1 - 17.5 g/dL    Hematocrit 37.3 (L) 38.9 - 50.9 %    MCV 85.4 80.0 - 99.0 fL    MCH 26.1 (L) 27.0 - 31.0 pg    MCHC 30.6 (L) 32.0 - 36.0 g/dL    RDW 15.6 (H) 11.3 - 14.5 %    RDW-SD 49.0 37.0 - 54.0 fl    MPV 9.9 6.0 - 12.0 fL    Platelets 169 150 - 450 10*3/mm3    Neutrophil % 64.6 41.0 - 71.0 %    Lymphocyte % 25.5 24.0 - 44.0 %    Monocyte % 7.8 0.0 - 12.0 %    Eosinophil % 1.4 0.0 - 3.0 %    Basophil % 0.3 0.0 - 1.0 %    Immature Grans % 0.4 0.0 - 0.6 %    Neutrophils, Absolute 5.11 1.50 - 8.30 10*3/mm3    Lymphocytes, Absolute 2.02 0.60 - 4.80 10*3/mm3    Monocytes, Absolute 0.62 0.00 - 1.00 10*3/mm3    Eosinophils, Absolute 0.11 0.00 - 0.30 10*3/mm3    Basophils, Absolute 0.02 0.00 - 0.20 10*3/mm3    Immature Grans, Absolute 0.03 0.00 - 0.03 10*3/mm3   Comprehensive Metabolic Panel   Result Value Ref Range    Glucose 94 70 - 100 mg/dL    BUN 12 9 - 23 mg/dL    Creatinine 1.40 (H) 0.60 - 1.30 mg/dL    Sodium 134 132 - 146 mmol/L    Potassium 4.2 3.5 - 5.5 mmol/L    Chloride 100 99 - 109 mmol/L    CO2 26.0 20.0 - 31.0 mmol/L    Calcium 8.9 8.7 - 10.4 mg/dL    Total Protein 6.8 5.7 - 8.2 g/dL    Albumin 3.70 3.20 - 4.80 g/dL    ALT (SGPT) 17 7 - 40 U/L    AST (SGOT) 16 0 - 33 U/L    Alkaline Phosphatase 54 25 - 100 U/L    Total Bilirubin 0.5 0.3 - 1.2 mg/dL    eGFR  African Amer 63 >60 mL/min/1.73    Globulin 3.1 gm/dL    A/G Ratio 1.2 (L) 1.5 - 2.5 g/dL    BUN/Creatinine Ratio 8.6 7.0 - 25.0    Anion Gap 8.0 3.0 - 11.0 mmol/L   Hemoglobin A1c   Result Value Ref Range    Hemoglobin A1C 6.60 (H) 4.80 - 5.60 %   POC  Troponin, Rapid   Result Value Ref Range    Troponin I 0.00 0.00 - 0.07 ng/mL   Light Blue Top   Result Value Ref Range    Extra Tube hold for add-on    Green Top (Gel)   Result Value Ref Range    Extra Tube Hold for add-ons.    Lavender Top   Result Value Ref Range    Extra Tube hold for add-on    Gold Top - SST   Result Value Ref Range    Extra Tube Hold for add-ons.          Assessment/Plan   All was seen today for gout.    Diagnoses and all orders for this visit:    Chronic tophaceous gout    Screening for endocrine disorder  -     T4, Free  -     TSH    Screening for lipoid disorders  -     Lipid Panel    Encounter for screening for other disorder  -     Comprehensive Metabolic Panel  -     CBC & Differential    Need for hepatitis C screening test  -     Hepatitis C Antibody    Encounter for screening colonoscopy    Idiopathic chronic gout of multiple sites with tophus  -     Rheumatoid Factor  -     Sedimentation Rate  -     Uric Acid  -     Ambulatory Referral to Rheumatology    Annual physical exam  Complains of GERD x 7-8 years. Associated with heartburn. Certain foods-like spices, black pepper worsen symptoms.  States he has seen a rheumatologist in the past for gout but requests new referral to different provider. (Not Dr Emery)  Complains of HTN intermittently for past 2 years. States he was treated in the past and then medicine was stopped because he did not need it. States blood pressure elevated today due to pain.  Patient complains of chronic tophaceous gout since age 18. Associated with pain and multiple tophi, disfigurement of hands and feet.       Patient education discussed during this visit:  - avoidance of texting while driving and the need for wearing seatbelt  - use of sunscreen  - healthy sleep habits and appropriate amount of sleep  - H2O consumption, well-balanced diet  - exercise routine which includes at least 150 minutes of cardio per week + muscle strengthening exercises  -  immunizations including annual flu vaccination      Return in about 3 months (around 8/8/2018).

## 2018-05-10 ENCOUNTER — TELEPHONE (OUTPATIENT)
Dept: INTERNAL MEDICINE | Facility: CLINIC | Age: 58
End: 2018-05-10

## 2018-05-10 LAB
ALBUMIN SERPL-MCNC: 4.7 G/DL (ref 3.2–4.8)
ALBUMIN/GLOB SERPL: 1.3 G/DL (ref 1.5–2.5)
ALP SERPL-CCNC: 72 U/L (ref 25–100)
ALT SERPL-CCNC: 37 U/L (ref 7–40)
AST SERPL-CCNC: 27 U/L (ref 0–33)
BASOPHILS # BLD MANUAL: 0 10*3/MM3 (ref 0–0.2)
BASOPHILS NFR BLD MANUAL: 0 % (ref 0–1)
BILIRUB SERPL-MCNC: 0.6 MG/DL (ref 0.3–1.2)
BUN SERPL-MCNC: 11 MG/DL (ref 9–23)
BUN/CREAT SERPL: 7.3 (ref 7–25)
CALCIUM SERPL-MCNC: 10 MG/DL (ref 8.7–10.4)
CHLORIDE SERPL-SCNC: 100 MMOL/L (ref 99–109)
CHOLEST SERPL-MCNC: 235 MG/DL (ref 0–200)
CO2 SERPL-SCNC: 23 MMOL/L (ref 20–31)
CREAT SERPL-MCNC: 1.5 MG/DL (ref 0.6–1.3)
DIFFERENTIAL COMMENT: ABNORMAL
EOSINOPHIL # BLD MANUAL: 0.09 10*3/MM3 (ref 0.1–0.3)
EOSINOPHIL NFR BLD MANUAL: 1 % (ref 0–3)
ERYTHROCYTE [DISTWIDTH] IN BLOOD BY AUTOMATED COUNT: 15.7 % (ref 11.3–14.5)
ERYTHROCYTE [SEDIMENTATION RATE] IN BLOOD BY WESTERGREN METHOD: >130 MM/HR (ref 0–20)
GFR SERPLBLD CREATININE-BSD FMLA CKD-EPI: 48 ML/MIN/1.73
GFR SERPLBLD CREATININE-BSD FMLA CKD-EPI: 58 ML/MIN/1.73
GLOBULIN SER CALC-MCNC: 3.5 GM/DL
GLUCOSE SERPL-MCNC: 144 MG/DL (ref 70–100)
HCT VFR BLD AUTO: 47 % (ref 38.9–50.9)
HCV AB S/CO SERPL IA: <0.1 S/CO RATIO (ref 0–0.9)
HDLC SERPL-MCNC: 39 MG/DL (ref 40–60)
HGB BLD-MCNC: 14.9 G/DL (ref 13.1–17.5)
LDLC SERPL CALC-MCNC: 160 MG/DL (ref 0–100)
LYMPHOCYTES # BLD MANUAL: 1.23 10*3/MM3 (ref 0.6–4.8)
LYMPHOCYTES NFR BLD MANUAL: 14.1 % (ref 24–44)
MCH RBC QN AUTO: 27.3 PG (ref 27–31)
MCHC RBC AUTO-ENTMCNC: 31.7 G/DL (ref 32–36)
MCV RBC AUTO: 86.2 FL (ref 80–99)
MONOCYTES # BLD MANUAL: 1.06 10*3/MM3 (ref 0–1)
MONOCYTES NFR BLD MANUAL: 12.1 % (ref 0–12)
NEUTROPHILS # BLD MANUAL: 6.36 10*3/MM3 (ref 1.5–8.3)
NEUTROPHILS NFR BLD MANUAL: 72.7 % (ref 41–71)
PLATELET # BLD AUTO: 261 10*3/MM3 (ref 150–450)
PLATELET BLD QL SMEAR: ABNORMAL
POTASSIUM SERPL-SCNC: 4.3 MMOL/L (ref 3.5–5.5)
PROT SERPL-MCNC: 8.2 G/DL (ref 5.7–8.2)
RBC # BLD AUTO: 5.45 10*6/MM3 (ref 4.2–5.76)
RBC MORPH BLD: ABNORMAL
RHEUMATOID FACT SERPL-ACNC: 14.3 IU/ML (ref 0–13.9)
SODIUM SERPL-SCNC: 139 MMOL/L (ref 132–146)
T4 FREE SERPL-MCNC: 1.35 NG/DL (ref 0.89–1.76)
TRIGL SERPL-MCNC: 182 MG/DL (ref 0–150)
TSH SERPL DL<=0.005 MIU/L-ACNC: 0.92 MIU/ML (ref 0.35–5.35)
URATE SERPL-MCNC: 8.6 MG/DL (ref 3.7–9.2)
VLDLC SERPL CALC-MCNC: 36.4 MG/DL
WBC # BLD AUTO: 8.75 10*3/MM3 (ref 3.5–10.8)

## 2018-05-14 ENCOUNTER — TELEPHONE (OUTPATIENT)
Dept: INTERNAL MEDICINE | Facility: CLINIC | Age: 58
End: 2018-05-14

## 2018-05-14 DIAGNOSIS — R79.89 ELEVATED SERUM CREATININE: Primary | ICD-10-CM

## 2018-05-14 RX ORDER — SULFASALAZINE 500 MG/1
500 TABLET ORAL 2 TIMES DAILY
Qty: 60 TABLET | Refills: 2 | Status: SHIPPED | OUTPATIENT
Start: 2018-05-14 | End: 2018-05-31 | Stop reason: HOSPADM

## 2018-05-14 NOTE — TELEPHONE ENCOUNTER
Telephone call to patient to discuss decision to start sulfasalazine while awaiting rheumatology referral. Discussed common side effects and severe reaction possibilities and need to take with food. Patient verbalizes understanding.

## 2018-05-25 ENCOUNTER — TELEPHONE (OUTPATIENT)
Dept: INTERNAL MEDICINE | Facility: CLINIC | Age: 58
End: 2018-05-25

## 2018-05-25 RX ORDER — ONDANSETRON 8 MG/1
8 TABLET, ORALLY DISINTEGRATING ORAL EVERY 8 HOURS PRN
Qty: 9 TABLET | Refills: 1 | Status: SHIPPED | OUTPATIENT
Start: 2018-05-25 | End: 2018-06-27

## 2018-05-25 NOTE — TELEPHONE ENCOUNTER
Telephone call to patient to inquire about any ER visits. Patient states he was seen at  yesterday. Explained Zofran sent to pharmacy. Will attempt to obtain records from  regarding ER visit.

## 2018-05-25 NOTE — TELEPHONE ENCOUNTER
----- Message from Lizett Mueller sent at 5/25/2018 10:59 AM EDT -----  PATIENT IS WANTING SOMETHING FOR NAUSEA CALLED IN TO HIS PHARMACY.     [facility] - [id number]    PATIENT CALL BACK : [phone]    THANK YOU

## 2018-05-27 ENCOUNTER — APPOINTMENT (OUTPATIENT)
Dept: CT IMAGING | Facility: HOSPITAL | Age: 58
End: 2018-05-27

## 2018-05-27 ENCOUNTER — HOSPITAL ENCOUNTER (OUTPATIENT)
Facility: HOSPITAL | Age: 58
Setting detail: OBSERVATION
Discharge: HOME | End: 2018-05-31
Attending: EMERGENCY MEDICINE | Admitting: HOSPITALIST

## 2018-05-27 DIAGNOSIS — Z74.09 IMPAIRED MOBILITY AND ADLS: ICD-10-CM

## 2018-05-27 DIAGNOSIS — M10.9 GENERALIZED GOUTY ARTHRITIS: ICD-10-CM

## 2018-05-27 DIAGNOSIS — R31.9 HEMATURIA, UNSPECIFIED TYPE: Primary | ICD-10-CM

## 2018-05-27 DIAGNOSIS — R31.29 OTHER MICROSCOPIC HEMATURIA: ICD-10-CM

## 2018-05-27 DIAGNOSIS — Z78.9 IMPAIRED MOBILITY AND ADLS: ICD-10-CM

## 2018-05-27 DIAGNOSIS — Z74.09 IMPAIRED FUNCTIONAL MOBILITY, BALANCE, GAIT, AND ENDURANCE: ICD-10-CM

## 2018-05-27 DIAGNOSIS — N20.0 KIDNEY STONES: ICD-10-CM

## 2018-05-27 DIAGNOSIS — M10.00 ACUTE IDIOPATHIC GOUT, UNSPECIFIED SITE: ICD-10-CM

## 2018-05-27 DIAGNOSIS — R11.2 NAUSEA AND VOMITING, INTRACTABILITY OF VOMITING NOT SPECIFIED, UNSPECIFIED VOMITING TYPE: ICD-10-CM

## 2018-05-27 PROBLEM — R19.7 DIARRHEA: Status: RESOLVED | Noted: 2018-05-27 | Resolved: 2018-05-27

## 2018-05-27 PROBLEM — R73.9 HYPERGLYCEMIA: Status: RESOLVED | Noted: 2018-03-27 | Resolved: 2018-05-27

## 2018-05-27 PROBLEM — M10.29 ACUTE DRUG-INDUCED GOUT OF MULTIPLE SITES: Status: ACTIVE | Noted: 2018-05-27

## 2018-05-27 PROBLEM — D72.829 LEUKOCYTOSIS: Status: RESOLVED | Noted: 2018-03-27 | Resolved: 2018-05-27

## 2018-05-27 PROBLEM — R10.9 ABDOMINAL PAIN WITH VOMITING: Status: ACTIVE | Noted: 2018-05-27

## 2018-05-27 PROBLEM — E87.20 LACTIC ACIDOSIS: Status: ACTIVE | Noted: 2018-05-27

## 2018-05-27 PROBLEM — E86.0 DEHYDRATION: Status: ACTIVE | Noted: 2018-05-27

## 2018-05-27 PROBLEM — E87.6 HYPOKALEMIA: Status: ACTIVE | Noted: 2018-05-27

## 2018-05-27 PROBLEM — R19.7 DIARRHEA: Status: ACTIVE | Noted: 2018-05-27

## 2018-05-27 PROBLEM — R11.10 ABDOMINAL PAIN WITH VOMITING: Status: RESOLVED | Noted: 2018-05-27 | Resolved: 2018-05-27

## 2018-05-27 PROBLEM — R10.9 ABDOMINAL PAIN WITH VOMITING: Status: RESOLVED | Noted: 2018-05-27 | Resolved: 2018-05-27

## 2018-05-27 PROBLEM — R11.10 ABDOMINAL PAIN WITH VOMITING: Status: ACTIVE | Noted: 2018-05-27

## 2018-05-27 PROBLEM — K52.9 GASTROENTERITIS: Status: ACTIVE | Noted: 2018-05-27

## 2018-05-27 LAB
ALBUMIN SERPL-MCNC: 4.1 G/DL (ref 3.2–4.8)
ALBUMIN/GLOB SERPL: 1.1 G/DL (ref 1.5–2.5)
ALP SERPL-CCNC: 61 U/L (ref 25–100)
ALT SERPL W P-5'-P-CCNC: 7 U/L (ref 7–40)
ANION GAP SERPL CALCULATED.3IONS-SCNC: 15 MMOL/L (ref 3–11)
AST SERPL-CCNC: 13 U/L (ref 0–33)
BACTERIA UR QL AUTO: ABNORMAL /HPF
BASOPHILS # BLD AUTO: 0.07 10*3/MM3 (ref 0–0.2)
BASOPHILS NFR BLD AUTO: 0.7 % (ref 0–1)
BILIRUB SERPL-MCNC: 0.9 MG/DL (ref 0.3–1.2)
BILIRUB UR QL STRIP: ABNORMAL
BNP SERPL-MCNC: 5 PG/ML (ref 0–100)
BUN BLD-MCNC: 8 MG/DL (ref 9–23)
BUN BLDA-MCNC: 9 MG/DL (ref 8–26)
BUN/CREAT SERPL: 6.7 (ref 7–25)
CA-I BLDA-SCNC: 1.2 MMOL/L (ref 1.2–1.32)
CALCIUM SPEC-SCNC: 10.6 MG/DL (ref 8.7–10.4)
CHLORIDE BLDA-SCNC: 95 MMOL/L (ref 98–109)
CHLORIDE SERPL-SCNC: 94 MMOL/L (ref 99–109)
CK SERPL-CCNC: 107 U/L (ref 26–174)
CLARITY UR: ABNORMAL
CO2 BLDA-SCNC: 27 MMOL/L (ref 24–29)
CO2 SERPL-SCNC: 23 MMOL/L (ref 20–31)
COARSE GRAN CASTS URNS QL MICRO: ABNORMAL /LPF
COLOR UR: ABNORMAL
CREAT BLD-MCNC: 1.2 MG/DL (ref 0.6–1.3)
CREAT BLDA-MCNC: 1.1 MG/DL (ref 0.6–1.3)
D-LACTATE SERPL-SCNC: 1.1 MMOL/L (ref 0.5–2)
D-LACTATE SERPL-SCNC: 2.1 MMOL/L (ref 0.5–2)
DEPRECATED RDW RBC AUTO: 46.5 FL (ref 37–54)
EOSINOPHIL # BLD AUTO: 0.2 10*3/MM3 (ref 0–0.3)
EOSINOPHIL NFR BLD AUTO: 2 % (ref 0–3)
ERYTHROCYTE [DISTWIDTH] IN BLOOD BY AUTOMATED COUNT: 14.8 % (ref 11.3–14.5)
GFR SERPL CREATININE-BSD FRML MDRD: 76 ML/MIN/1.73
GLOBULIN UR ELPH-MCNC: 3.9 GM/DL
GLUCOSE BLD-MCNC: 98 MG/DL (ref 70–100)
GLUCOSE BLDC GLUCOMTR-MCNC: 95 MG/DL (ref 70–130)
GLUCOSE UR STRIP-MCNC: NEGATIVE MG/DL
HCT VFR BLD AUTO: 41.8 % (ref 38.9–50.9)
HCT VFR BLDA CALC: 35 % (ref 38–51)
HGB BLD-MCNC: 13.1 G/DL (ref 13.1–17.5)
HGB BLDA-MCNC: 11.9 G/DL (ref 12–17)
HGB UR QL STRIP.AUTO: ABNORMAL
HOLD SPECIMEN: NORMAL
HYALINE CASTS UR QL AUTO: ABNORMAL /LPF
HYPOCHROMIA BLD QL: NORMAL
IMM GRANULOCYTES # BLD: 0.03 10*3/MM3 (ref 0–0.03)
IMM GRANULOCYTES NFR BLD: 0.3 % (ref 0–0.6)
KETONES UR QL STRIP: ABNORMAL
LEUKOCYTE ESTERASE UR QL STRIP.AUTO: ABNORMAL
LYMPHOCYTES # BLD AUTO: 2.5 10*3/MM3 (ref 0.6–4.8)
LYMPHOCYTES NFR BLD AUTO: 24.7 % (ref 24–44)
MCH RBC QN AUTO: 27.2 PG (ref 27–31)
MCHC RBC AUTO-ENTMCNC: 31.3 G/DL (ref 32–36)
MCV RBC AUTO: 86.7 FL (ref 80–99)
MONOCYTES # BLD AUTO: 1.13 10*3/MM3 (ref 0–1)
MONOCYTES NFR BLD AUTO: 11.2 % (ref 0–12)
MUCOUS THREADS URNS QL MICRO: ABNORMAL /HPF
NEUTROPHILS # BLD AUTO: 6.22 10*3/MM3 (ref 1.5–8.3)
NEUTROPHILS NFR BLD AUTO: 61.4 % (ref 41–71)
NITRITE UR QL STRIP: NEGATIVE
PH UR STRIP.AUTO: 5.5 [PH] (ref 5–8)
PLAT MORPH BLD: NORMAL
PLATELET # BLD AUTO: 240 10*3/MM3 (ref 150–450)
PMV BLD AUTO: 9.9 FL (ref 6–12)
POLYCHROMASIA BLD QL SMEAR: NORMAL
POTASSIUM BLD-SCNC: 3.1 MMOL/L (ref 3.5–5.5)
POTASSIUM BLDA-SCNC: 3.2 MMOL/L (ref 3.5–4.9)
PROT SERPL-MCNC: 8 G/DL (ref 5.7–8.2)
PROT UR QL STRIP: ABNORMAL
RBC # BLD AUTO: 4.82 10*6/MM3 (ref 4.2–5.76)
RBC # UR: ABNORMAL /HPF
REF LAB TEST METHOD: ABNORMAL
SODIUM BLD-SCNC: 132 MMOL/L (ref 132–146)
SODIUM BLDA-SCNC: 138 MMOL/L (ref 138–146)
SP GR UR STRIP: 1.02 (ref 1–1.03)
SQUAMOUS #/AREA URNS HPF: ABNORMAL /HPF
TROPONIN I SERPL-MCNC: <0.006 NG/ML
URATE SERPL-MCNC: 9.6 MG/DL (ref 3.7–9.2)
UROBILINOGEN UR QL STRIP: ABNORMAL
WBC MORPH BLD: NORMAL
WBC NRBC COR # BLD: 10.12 10*3/MM3 (ref 3.5–10.8)
WBC UR QL AUTO: ABNORMAL /HPF
WHOLE BLOOD HOLD SPECIMEN: NORMAL
WHOLE BLOOD HOLD SPECIMEN: NORMAL

## 2018-05-27 PROCEDURE — 87045 FECES CULTURE AEROBIC BACT: CPT | Performed by: NURSE PRACTITIONER

## 2018-05-27 PROCEDURE — G0378 HOSPITAL OBSERVATION PER HR: HCPCS

## 2018-05-27 PROCEDURE — 84550 ASSAY OF BLOOD/URIC ACID: CPT | Performed by: NURSE PRACTITIONER

## 2018-05-27 PROCEDURE — 87209 SMEAR COMPLEX STAIN: CPT | Performed by: NURSE PRACTITIONER

## 2018-05-27 PROCEDURE — 83605 ASSAY OF LACTIC ACID: CPT | Performed by: NURSE PRACTITIONER

## 2018-05-27 PROCEDURE — 74176 CT ABD & PELVIS W/O CONTRAST: CPT

## 2018-05-27 PROCEDURE — 83880 ASSAY OF NATRIURETIC PEPTIDE: CPT | Performed by: EMERGENCY MEDICINE

## 2018-05-27 PROCEDURE — 96374 THER/PROPH/DIAG INJ IV PUSH: CPT

## 2018-05-27 PROCEDURE — 80053 COMPREHEN METABOLIC PANEL: CPT | Performed by: NURSE PRACTITIONER

## 2018-05-27 PROCEDURE — 85014 HEMATOCRIT: CPT

## 2018-05-27 PROCEDURE — 96361 HYDRATE IV INFUSION ADD-ON: CPT

## 2018-05-27 PROCEDURE — 82550 ASSAY OF CK (CPK): CPT | Performed by: EMERGENCY MEDICINE

## 2018-05-27 PROCEDURE — 80047 BASIC METABLC PNL IONIZED CA: CPT

## 2018-05-27 PROCEDURE — 99220 PR INITIAL OBSERVATION CARE/DAY 70 MINUTES: CPT | Performed by: FAMILY MEDICINE

## 2018-05-27 PROCEDURE — 25010000002 ONDANSETRON PER 1 MG: Performed by: EMERGENCY MEDICINE

## 2018-05-27 PROCEDURE — 87177 OVA AND PARASITES SMEARS: CPT | Performed by: NURSE PRACTITIONER

## 2018-05-27 PROCEDURE — 99285 EMERGENCY DEPT VISIT HI MDM: CPT

## 2018-05-27 PROCEDURE — 87046 STOOL CULTR AEROBIC BACT EA: CPT | Performed by: NURSE PRACTITIONER

## 2018-05-27 PROCEDURE — 85007 BL SMEAR W/DIFF WBC COUNT: CPT | Performed by: NURSE PRACTITIONER

## 2018-05-27 PROCEDURE — 85025 COMPLETE CBC W/AUTO DIFF WBC: CPT | Performed by: NURSE PRACTITIONER

## 2018-05-27 PROCEDURE — 25010000002 HEPARIN (PORCINE) PER 1000 UNITS: Performed by: NURSE PRACTITIONER

## 2018-05-27 PROCEDURE — 25010000002 HYDROMORPHONE PER 4 MG: Performed by: EMERGENCY MEDICINE

## 2018-05-27 PROCEDURE — 96372 THER/PROPH/DIAG INJ SC/IM: CPT

## 2018-05-27 PROCEDURE — 96375 TX/PRO/DX INJ NEW DRUG ADDON: CPT

## 2018-05-27 PROCEDURE — 81001 URINALYSIS AUTO W/SCOPE: CPT | Performed by: EMERGENCY MEDICINE

## 2018-05-27 PROCEDURE — 87040 BLOOD CULTURE FOR BACTERIA: CPT | Performed by: NURSE PRACTITIONER

## 2018-05-27 PROCEDURE — 84484 ASSAY OF TROPONIN QUANT: CPT | Performed by: EMERGENCY MEDICINE

## 2018-05-27 PROCEDURE — 96376 TX/PRO/DX INJ SAME DRUG ADON: CPT

## 2018-05-27 PROCEDURE — 25010000002 HYDROMORPHONE PER 4 MG: Performed by: FAMILY MEDICINE

## 2018-05-27 PROCEDURE — 25010000002 ONDANSETRON PER 1 MG: Performed by: NURSE PRACTITIONER

## 2018-05-27 RX ORDER — FEBUXOSTAT 40 MG/1
40 TABLET, FILM COATED ORAL DAILY
Status: DISCONTINUED | OUTPATIENT
Start: 2018-05-27 | End: 2018-05-31 | Stop reason: HOSPADM

## 2018-05-27 RX ORDER — HYDROMORPHONE HYDROCHLORIDE 1 MG/ML
0.5 INJECTION, SOLUTION INTRAMUSCULAR; INTRAVENOUS; SUBCUTANEOUS EVERY 4 HOURS PRN
Status: DISCONTINUED | OUTPATIENT
Start: 2018-05-27 | End: 2018-05-31 | Stop reason: HOSPADM

## 2018-05-27 RX ORDER — FAMOTIDINE 20 MG/1
20 TABLET, FILM COATED ORAL 2 TIMES DAILY
Status: DISCONTINUED | OUTPATIENT
Start: 2018-05-27 | End: 2018-05-31 | Stop reason: HOSPADM

## 2018-05-27 RX ORDER — SODIUM CHLORIDE 9 MG/ML
75 INJECTION, SOLUTION INTRAVENOUS CONTINUOUS
Status: DISCONTINUED | OUTPATIENT
Start: 2018-05-27 | End: 2018-05-28

## 2018-05-27 RX ORDER — PANTOPRAZOLE SODIUM 40 MG/1
40 TABLET, DELAYED RELEASE ORAL EVERY MORNING
Status: DISCONTINUED | OUTPATIENT
Start: 2018-05-28 | End: 2018-05-31 | Stop reason: HOSPADM

## 2018-05-27 RX ORDER — ONDANSETRON 2 MG/ML
4 INJECTION INTRAMUSCULAR; INTRAVENOUS ONCE
Status: COMPLETED | OUTPATIENT
Start: 2018-05-27 | End: 2018-05-27

## 2018-05-27 RX ORDER — PREDNISONE 10 MG/1
10 TABLET ORAL
Status: DISCONTINUED | OUTPATIENT
Start: 2018-05-28 | End: 2018-05-29

## 2018-05-27 RX ORDER — PREDNISONE 10 MG/1
10 TABLET ORAL DAILY
COMMUNITY
End: 2018-05-31 | Stop reason: HOSPADM

## 2018-05-27 RX ORDER — POTASSIUM CHLORIDE 1.5 G/1.77G
40 POWDER, FOR SOLUTION ORAL AS NEEDED
Status: DISCONTINUED | OUTPATIENT
Start: 2018-05-27 | End: 2018-05-31 | Stop reason: HOSPADM

## 2018-05-27 RX ORDER — SODIUM CHLORIDE 0.9 % (FLUSH) 0.9 %
1-10 SYRINGE (ML) INJECTION AS NEEDED
Status: DISCONTINUED | OUTPATIENT
Start: 2018-05-27 | End: 2018-05-31 | Stop reason: HOSPADM

## 2018-05-27 RX ORDER — ACETAMINOPHEN 325 MG/1
650 TABLET ORAL EVERY 4 HOURS PRN
Status: DISCONTINUED | OUTPATIENT
Start: 2018-05-27 | End: 2018-05-31 | Stop reason: HOSPADM

## 2018-05-27 RX ORDER — SULFASALAZINE 500 MG/1
500 TABLET ORAL 2 TIMES DAILY
Status: DISCONTINUED | OUTPATIENT
Start: 2018-05-27 | End: 2018-05-28

## 2018-05-27 RX ORDER — HEPARIN SODIUM 5000 [USP'U]/ML
5000 INJECTION, SOLUTION INTRAVENOUS; SUBCUTANEOUS EVERY 12 HOURS SCHEDULED
Status: DISCONTINUED | OUTPATIENT
Start: 2018-05-27 | End: 2018-05-31 | Stop reason: HOSPADM

## 2018-05-27 RX ORDER — FEBUXOSTAT 40 MG/1
80 TABLET, FILM COATED ORAL DAILY
Status: DISCONTINUED | OUTPATIENT
Start: 2018-05-27 | End: 2018-05-27 | Stop reason: DRUGHIGH

## 2018-05-27 RX ORDER — LIDOCAINE 50 MG/G
2 PATCH TOPICAL
Status: DISCONTINUED | OUTPATIENT
Start: 2018-05-27 | End: 2018-05-31 | Stop reason: HOSPADM

## 2018-05-27 RX ORDER — POTASSIUM CHLORIDE 750 MG/1
40 CAPSULE, EXTENDED RELEASE ORAL AS NEEDED
Status: DISCONTINUED | OUTPATIENT
Start: 2018-05-27 | End: 2018-05-31 | Stop reason: HOSPADM

## 2018-05-27 RX ORDER — SODIUM CHLORIDE 0.9 % (FLUSH) 0.9 %
10 SYRINGE (ML) INJECTION AS NEEDED
Status: DISCONTINUED | OUTPATIENT
Start: 2018-05-27 | End: 2018-05-27

## 2018-05-27 RX ORDER — COLCHICINE 0.6 MG/1
0.6 TABLET ORAL EVERY OTHER DAY
Status: DISCONTINUED | OUTPATIENT
Start: 2018-05-27 | End: 2018-05-29

## 2018-05-27 RX ORDER — ONDANSETRON 2 MG/ML
4 INJECTION INTRAMUSCULAR; INTRAVENOUS EVERY 6 HOURS PRN
Status: DISCONTINUED | OUTPATIENT
Start: 2018-05-27 | End: 2018-05-31 | Stop reason: HOSPADM

## 2018-05-27 RX ORDER — HYDROMORPHONE HYDROCHLORIDE 1 MG/ML
0.5 INJECTION, SOLUTION INTRAMUSCULAR; INTRAVENOUS; SUBCUTANEOUS ONCE
Status: COMPLETED | OUTPATIENT
Start: 2018-05-27 | End: 2018-05-27

## 2018-05-27 RX ORDER — L.ACID,PARA/B.BIFIDUM/S.THERM 8B CELL
1 CAPSULE ORAL DAILY
Status: DISCONTINUED | OUTPATIENT
Start: 2018-05-27 | End: 2018-05-31 | Stop reason: HOSPADM

## 2018-05-27 RX ADMIN — HYDROMORPHONE HYDROCHLORIDE 0.5 MG: 1 INJECTION, SOLUTION INTRAMUSCULAR; INTRAVENOUS; SUBCUTANEOUS at 11:05

## 2018-05-27 RX ADMIN — FAMOTIDINE 20 MG: 20 TABLET ORAL at 20:32

## 2018-05-27 RX ADMIN — ONDANSETRON 4 MG: 2 INJECTION INTRAMUSCULAR; INTRAVENOUS at 11:04

## 2018-05-27 RX ADMIN — SULFASALAZINE 500 MG: 500 TABLET ORAL at 20:32

## 2018-05-27 RX ADMIN — HEPARIN SODIUM 5000 UNITS: 5000 INJECTION, SOLUTION INTRAVENOUS; SUBCUTANEOUS at 20:33

## 2018-05-27 RX ADMIN — LIDOCAINE 2 PATCH: 50 PATCH CUTANEOUS at 19:01

## 2018-05-27 RX ADMIN — HYDROMORPHONE HYDROCHLORIDE 0.5 MG: 1 INJECTION, SOLUTION INTRAMUSCULAR; INTRAVENOUS; SUBCUTANEOUS at 16:47

## 2018-05-27 RX ADMIN — ONDANSETRON 4 MG: 2 INJECTION, SOLUTION INTRAMUSCULAR; INTRAVENOUS at 16:35

## 2018-05-27 RX ADMIN — COLCHICINE 0.6 MG: 0.6 TABLET, FILM COATED ORAL at 16:36

## 2018-05-27 RX ADMIN — SODIUM CHLORIDE 75 ML/HR: 9 INJECTION, SOLUTION INTRAVENOUS at 16:36

## 2018-05-27 RX ADMIN — ACETAMINOPHEN 650 MG: 325 TABLET, FILM COATED ORAL at 16:36

## 2018-05-27 RX ADMIN — SODIUM CHLORIDE 1000 ML: 9 INJECTION, SOLUTION INTRAVENOUS at 11:03

## 2018-05-27 RX ADMIN — HYDROMORPHONE HYDROCHLORIDE 0.5 MG: 1 INJECTION, SOLUTION INTRAMUSCULAR; INTRAVENOUS; SUBCUTANEOUS at 20:33

## 2018-05-27 RX ADMIN — POTASSIUM CHLORIDE 40 MEQ: 750 CAPSULE, EXTENDED RELEASE ORAL at 16:36

## 2018-05-27 RX ADMIN — Medication 10 ML: at 14:59

## 2018-05-27 RX ADMIN — POTASSIUM CHLORIDE 40 MEQ: 750 CAPSULE, EXTENDED RELEASE ORAL at 20:32

## 2018-05-27 RX ADMIN — FEBUXOSTAT 40 MG: 40 TABLET ORAL at 19:01

## 2018-05-27 RX ADMIN — Medication 1 CAPSULE: at 16:36

## 2018-05-27 RX ADMIN — SODIUM CHLORIDE 1000 ML: 9 INJECTION, SOLUTION INTRAVENOUS at 12:49

## 2018-05-28 LAB
ANION GAP SERPL CALCULATED.3IONS-SCNC: 9 MMOL/L (ref 3–11)
BASOPHILS # BLD AUTO: 0.03 10*3/MM3 (ref 0–0.2)
BASOPHILS NFR BLD AUTO: 0.4 % (ref 0–1)
BUN BLD-MCNC: 7 MG/DL (ref 9–23)
BUN/CREAT SERPL: 7 (ref 7–25)
CALCIUM SPEC-SCNC: 9.9 MG/DL (ref 8.7–10.4)
CHLORIDE SERPL-SCNC: 98 MMOL/L (ref 99–109)
CO2 SERPL-SCNC: 26 MMOL/L (ref 20–31)
CREAT BLD-MCNC: 1 MG/DL (ref 0.6–1.3)
DEPRECATED RDW RBC AUTO: 45.2 FL (ref 37–54)
EOSINOPHIL # BLD AUTO: 0.36 10*3/MM3 (ref 0–0.3)
EOSINOPHIL NFR BLD AUTO: 4.9 % (ref 0–3)
ERYTHROCYTE [DISTWIDTH] IN BLOOD BY AUTOMATED COUNT: 14.5 % (ref 11.3–14.5)
GFR SERPL CREATININE-BSD FRML MDRD: 93 ML/MIN/1.73
GLUCOSE BLD-MCNC: 106 MG/DL (ref 70–100)
HCT VFR BLD AUTO: 34.9 % (ref 38.9–50.9)
HGB BLD-MCNC: 10.9 G/DL (ref 13.1–17.5)
IMM GRANULOCYTES # BLD: 0.03 10*3/MM3 (ref 0–0.03)
IMM GRANULOCYTES NFR BLD: 0.4 % (ref 0–0.6)
LYMPHOCYTES # BLD AUTO: 1.62 10*3/MM3 (ref 0.6–4.8)
LYMPHOCYTES NFR BLD AUTO: 21.8 % (ref 24–44)
MCH RBC QN AUTO: 26.5 PG (ref 27–31)
MCHC RBC AUTO-ENTMCNC: 31.2 G/DL (ref 32–36)
MCV RBC AUTO: 84.7 FL (ref 80–99)
MONOCYTES # BLD AUTO: 0.93 10*3/MM3 (ref 0–1)
MONOCYTES NFR BLD AUTO: 12.5 % (ref 0–12)
NEUTROPHILS # BLD AUTO: 4.48 10*3/MM3 (ref 1.5–8.3)
NEUTROPHILS NFR BLD AUTO: 60.4 % (ref 41–71)
PLATELET # BLD AUTO: 257 10*3/MM3 (ref 150–450)
PMV BLD AUTO: 9.7 FL (ref 6–12)
POTASSIUM BLD-SCNC: 3.4 MMOL/L (ref 3.5–5.5)
RBC # BLD AUTO: 4.12 10*6/MM3 (ref 4.2–5.76)
SODIUM BLD-SCNC: 133 MMOL/L (ref 132–146)
WBC NRBC COR # BLD: 7.42 10*3/MM3 (ref 3.5–10.8)

## 2018-05-28 PROCEDURE — 99226 PR SBSQ OBSERVATION CARE/DAY 35 MINUTES: CPT | Performed by: INTERNAL MEDICINE

## 2018-05-28 PROCEDURE — 96372 THER/PROPH/DIAG INJ SC/IM: CPT

## 2018-05-28 PROCEDURE — 25810000003 SODIUM CHLORIDE 0.9 % WITH KCL 20 MEQ 20-0.9 MEQ/L-% SOLUTION: Performed by: INTERNAL MEDICINE

## 2018-05-28 PROCEDURE — G8988 SELF CARE GOAL STATUS: HCPCS | Performed by: OCCUPATIONAL THERAPIST

## 2018-05-28 PROCEDURE — 97530 THERAPEUTIC ACTIVITIES: CPT | Performed by: OCCUPATIONAL THERAPIST

## 2018-05-28 PROCEDURE — G0378 HOSPITAL OBSERVATION PER HR: HCPCS

## 2018-05-28 PROCEDURE — 96361 HYDRATE IV INFUSION ADD-ON: CPT

## 2018-05-28 PROCEDURE — 85025 COMPLETE CBC W/AUTO DIFF WBC: CPT | Performed by: NURSE PRACTITIONER

## 2018-05-28 PROCEDURE — 97162 PT EVAL MOD COMPLEX 30 MIN: CPT

## 2018-05-28 PROCEDURE — G8987 SELF CARE CURRENT STATUS: HCPCS | Performed by: OCCUPATIONAL THERAPIST

## 2018-05-28 PROCEDURE — 80048 BASIC METABOLIC PNL TOTAL CA: CPT | Performed by: NURSE PRACTITIONER

## 2018-05-28 PROCEDURE — G8979 MOBILITY GOAL STATUS: HCPCS

## 2018-05-28 PROCEDURE — 63710000001 PREDNISONE PER 5 MG: Performed by: NURSE PRACTITIONER

## 2018-05-28 PROCEDURE — 25010000002 HEPARIN (PORCINE) PER 1000 UNITS: Performed by: NURSE PRACTITIONER

## 2018-05-28 PROCEDURE — 96376 TX/PRO/DX INJ SAME DRUG ADON: CPT

## 2018-05-28 PROCEDURE — 97166 OT EVAL MOD COMPLEX 45 MIN: CPT | Performed by: OCCUPATIONAL THERAPIST

## 2018-05-28 PROCEDURE — 25010000002 HYDROMORPHONE PER 4 MG: Performed by: FAMILY MEDICINE

## 2018-05-28 PROCEDURE — G8978 MOBILITY CURRENT STATUS: HCPCS

## 2018-05-28 RX ORDER — SODIUM CHLORIDE AND POTASSIUM CHLORIDE 150; 900 MG/100ML; MG/100ML
75 INJECTION, SOLUTION INTRAVENOUS CONTINUOUS
Status: DISCONTINUED | OUTPATIENT
Start: 2018-05-28 | End: 2018-05-30

## 2018-05-28 RX ADMIN — LIDOCAINE 2 PATCH: 50 PATCH CUTANEOUS at 08:35

## 2018-05-28 RX ADMIN — PANTOPRAZOLE SODIUM 40 MG: 40 TABLET, DELAYED RELEASE ORAL at 05:49

## 2018-05-28 RX ADMIN — FAMOTIDINE 20 MG: 20 TABLET ORAL at 20:10

## 2018-05-28 RX ADMIN — FEBUXOSTAT 40 MG: 40 TABLET ORAL at 08:35

## 2018-05-28 RX ADMIN — HEPARIN SODIUM 5000 UNITS: 5000 INJECTION, SOLUTION INTRAVENOUS; SUBCUTANEOUS at 08:36

## 2018-05-28 RX ADMIN — POTASSIUM CHLORIDE AND SODIUM CHLORIDE 75 ML/HR: 900; 150 INJECTION, SOLUTION INTRAVENOUS at 11:54

## 2018-05-28 RX ADMIN — PREDNISONE 10 MG: 10 TABLET ORAL at 08:35

## 2018-05-28 RX ADMIN — HYDROMORPHONE HYDROCHLORIDE 0.5 MG: 1 INJECTION, SOLUTION INTRAMUSCULAR; INTRAVENOUS; SUBCUTANEOUS at 05:49

## 2018-05-28 RX ADMIN — SULFASALAZINE 500 MG: 500 TABLET ORAL at 08:35

## 2018-05-28 RX ADMIN — HYDROMORPHONE HYDROCHLORIDE 0.5 MG: 1 INJECTION, SOLUTION INTRAMUSCULAR; INTRAVENOUS; SUBCUTANEOUS at 19:38

## 2018-05-28 RX ADMIN — FAMOTIDINE 20 MG: 20 TABLET ORAL at 08:35

## 2018-05-28 RX ADMIN — Medication 1 CAPSULE: at 08:35

## 2018-05-28 RX ADMIN — HEPARIN SODIUM 5000 UNITS: 5000 INJECTION, SOLUTION INTRAVENOUS; SUBCUTANEOUS at 20:10

## 2018-05-29 LAB — BACTERIA SPEC AEROBE CULT: NORMAL

## 2018-05-29 PROCEDURE — 25010000002 HYDROMORPHONE PER 4 MG: Performed by: FAMILY MEDICINE

## 2018-05-29 PROCEDURE — 99225 PR SBSQ OBSERVATION CARE/DAY 25 MINUTES: CPT | Performed by: INTERNAL MEDICINE

## 2018-05-29 PROCEDURE — 25010000002 HEPARIN (PORCINE) PER 1000 UNITS: Performed by: NURSE PRACTITIONER

## 2018-05-29 PROCEDURE — G0378 HOSPITAL OBSERVATION PER HR: HCPCS

## 2018-05-29 PROCEDURE — 96361 HYDRATE IV INFUSION ADD-ON: CPT

## 2018-05-29 PROCEDURE — 63710000001 PREDNISONE PER 5 MG: Performed by: NURSE PRACTITIONER

## 2018-05-29 PROCEDURE — 25810000003 SODIUM CHLORIDE 0.9 % WITH KCL 20 MEQ 20-0.9 MEQ/L-% SOLUTION: Performed by: INTERNAL MEDICINE

## 2018-05-29 PROCEDURE — 97110 THERAPEUTIC EXERCISES: CPT

## 2018-05-29 PROCEDURE — 96372 THER/PROPH/DIAG INJ SC/IM: CPT

## 2018-05-29 PROCEDURE — 96376 TX/PRO/DX INJ SAME DRUG ADON: CPT

## 2018-05-29 PROCEDURE — 63710000001 PREDNISONE PER 5 MG: Performed by: INTERNAL MEDICINE

## 2018-05-29 PROCEDURE — 97530 THERAPEUTIC ACTIVITIES: CPT

## 2018-05-29 RX ORDER — PREDNISONE 10 MG/1
10 TABLET ORAL ONCE
Status: COMPLETED | OUTPATIENT
Start: 2018-05-29 | End: 2018-05-29

## 2018-05-29 RX ORDER — COLCHICINE 0.6 MG/1
0.6 TABLET ORAL DAILY
Status: DISCONTINUED | OUTPATIENT
Start: 2018-05-30 | End: 2018-05-31 | Stop reason: HOSPADM

## 2018-05-29 RX ORDER — PREDNISONE 20 MG/1
20 TABLET ORAL
Status: DISCONTINUED | OUTPATIENT
Start: 2018-05-30 | End: 2018-05-31 | Stop reason: HOSPADM

## 2018-05-29 RX ADMIN — FAMOTIDINE 20 MG: 20 TABLET ORAL at 21:41

## 2018-05-29 RX ADMIN — HYDROMORPHONE HYDROCHLORIDE 0.5 MG: 1 INJECTION, SOLUTION INTRAMUSCULAR; INTRAVENOUS; SUBCUTANEOUS at 03:50

## 2018-05-29 RX ADMIN — COLCHICINE 0.6 MG: 0.6 TABLET, FILM COATED ORAL at 08:32

## 2018-05-29 RX ADMIN — HYDROMORPHONE HYDROCHLORIDE 0.5 MG: 1 INJECTION, SOLUTION INTRAMUSCULAR; INTRAVENOUS; SUBCUTANEOUS at 21:41

## 2018-05-29 RX ADMIN — FAMOTIDINE 20 MG: 20 TABLET ORAL at 08:32

## 2018-05-29 RX ADMIN — HYDROMORPHONE HYDROCHLORIDE 0.5 MG: 1 INJECTION, SOLUTION INTRAMUSCULAR; INTRAVENOUS; SUBCUTANEOUS at 11:07

## 2018-05-29 RX ADMIN — PREDNISONE 10 MG: 10 TABLET ORAL at 11:41

## 2018-05-29 RX ADMIN — LIDOCAINE 2 PATCH: 50 PATCH CUTANEOUS at 08:32

## 2018-05-29 RX ADMIN — HEPARIN SODIUM 5000 UNITS: 5000 INJECTION, SOLUTION INTRAVENOUS; SUBCUTANEOUS at 21:41

## 2018-05-29 RX ADMIN — HEPARIN SODIUM 5000 UNITS: 5000 INJECTION, SOLUTION INTRAVENOUS; SUBCUTANEOUS at 08:32

## 2018-05-29 RX ADMIN — Medication 1 CAPSULE: at 08:32

## 2018-05-29 RX ADMIN — PANTOPRAZOLE SODIUM 40 MG: 40 TABLET, DELAYED RELEASE ORAL at 06:43

## 2018-05-29 RX ADMIN — POTASSIUM CHLORIDE AND SODIUM CHLORIDE 75 ML/HR: 900; 150 INJECTION, SOLUTION INTRAVENOUS at 00:03

## 2018-05-29 RX ADMIN — PREDNISONE 10 MG: 10 TABLET ORAL at 08:32

## 2018-05-29 RX ADMIN — FEBUXOSTAT 40 MG: 40 TABLET ORAL at 08:32

## 2018-05-30 ENCOUNTER — APPOINTMENT (OUTPATIENT)
Dept: GENERAL RADIOLOGY | Facility: HOSPITAL | Age: 58
End: 2018-05-30

## 2018-05-30 LAB
ALBUMIN SERPL-MCNC: 3.6 G/DL (ref 3.2–4.8)
ALBUMIN/GLOB SERPL: 1.1 G/DL (ref 1.5–2.5)
ALP SERPL-CCNC: 51 U/L (ref 25–100)
ALT SERPL W P-5'-P-CCNC: 6 U/L (ref 7–40)
ANION GAP SERPL CALCULATED.3IONS-SCNC: 12 MMOL/L (ref 3–11)
ANION GAP SERPL CALCULATED.3IONS-SCNC: 9 MMOL/L (ref 3–11)
AST SERPL-CCNC: 18 U/L (ref 0–33)
BASOPHILS # BLD AUTO: 0.02 10*3/MM3 (ref 0–0.2)
BASOPHILS NFR BLD AUTO: 0.3 % (ref 0–1)
BILIRUB SERPL-MCNC: 0.2 MG/DL (ref 0.3–1.2)
BUN BLD-MCNC: 11 MG/DL (ref 9–23)
BUN BLD-MCNC: 11 MG/DL (ref 9–23)
BUN/CREAT SERPL: 11 (ref 7–25)
BUN/CREAT SERPL: 11 (ref 7–25)
CALCIUM SPEC-SCNC: 9.5 MG/DL (ref 8.7–10.4)
CALCIUM SPEC-SCNC: 9.6 MG/DL (ref 8.7–10.4)
CHLORIDE SERPL-SCNC: 104 MMOL/L (ref 99–109)
CHLORIDE SERPL-SCNC: 107 MMOL/L (ref 99–109)
CO2 SERPL-SCNC: 24 MMOL/L (ref 20–31)
CO2 SERPL-SCNC: 26 MMOL/L (ref 20–31)
CREAT BLD-MCNC: 1 MG/DL (ref 0.6–1.3)
CREAT BLD-MCNC: 1 MG/DL (ref 0.6–1.3)
CRP SERPL-MCNC: 16.66 MG/DL (ref 0–1)
DEPRECATED RDW RBC AUTO: 44 FL (ref 37–54)
EOSINOPHIL # BLD AUTO: 0.02 10*3/MM3 (ref 0–0.3)
EOSINOPHIL NFR BLD AUTO: 0.3 % (ref 0–3)
ERYTHROCYTE [DISTWIDTH] IN BLOOD BY AUTOMATED COUNT: 14.2 % (ref 11.3–14.5)
GFR SERPL CREATININE-BSD FRML MDRD: 93 ML/MIN/1.73
GFR SERPL CREATININE-BSD FRML MDRD: 93 ML/MIN/1.73
GLOBULIN UR ELPH-MCNC: 3.3 GM/DL
GLUCOSE BLD-MCNC: 101 MG/DL (ref 70–100)
GLUCOSE BLD-MCNC: 90 MG/DL (ref 70–100)
HCT VFR BLD AUTO: 35.4 % (ref 38.9–50.9)
HGB BLD-MCNC: 11.2 G/DL (ref 13.1–17.5)
IMM GRANULOCYTES # BLD: 0.02 10*3/MM3 (ref 0–0.03)
IMM GRANULOCYTES NFR BLD: 0.3 % (ref 0–0.6)
LYMPHOCYTES # BLD AUTO: 0.93 10*3/MM3 (ref 0.6–4.8)
LYMPHOCYTES NFR BLD AUTO: 14.5 % (ref 24–44)
MCH RBC QN AUTO: 26.9 PG (ref 27–31)
MCHC RBC AUTO-ENTMCNC: 31.6 G/DL (ref 32–36)
MCV RBC AUTO: 84.9 FL (ref 80–99)
MONOCYTES # BLD AUTO: 0.15 10*3/MM3 (ref 0–1)
MONOCYTES NFR BLD AUTO: 2.3 % (ref 0–12)
NEUTROPHILS # BLD AUTO: 5.29 10*3/MM3 (ref 1.5–8.3)
NEUTROPHILS NFR BLD AUTO: 82.6 % (ref 41–71)
PLATELET # BLD AUTO: 302 10*3/MM3 (ref 150–450)
PMV BLD AUTO: 9.1 FL (ref 6–12)
POTASSIUM BLD-SCNC: 4 MMOL/L (ref 3.5–5.5)
POTASSIUM BLD-SCNC: 4.2 MMOL/L (ref 3.5–5.5)
PROT SERPL-MCNC: 6.9 G/DL (ref 5.7–8.2)
RBC # BLD AUTO: 4.17 10*6/MM3 (ref 4.2–5.76)
SODIUM BLD-SCNC: 139 MMOL/L (ref 132–146)
SODIUM BLD-SCNC: 143 MMOL/L (ref 132–146)
URATE SERPL-MCNC: 5.5 MG/DL (ref 3.7–9.2)
WBC NRBC COR # BLD: 6.41 10*3/MM3 (ref 3.5–10.8)

## 2018-05-30 PROCEDURE — 96376 TX/PRO/DX INJ SAME DRUG ADON: CPT

## 2018-05-30 PROCEDURE — G0378 HOSPITAL OBSERVATION PER HR: HCPCS

## 2018-05-30 PROCEDURE — 97110 THERAPEUTIC EXERCISES: CPT

## 2018-05-30 PROCEDURE — 25810000003 SODIUM CHLORIDE 0.9 % WITH KCL 20 MEQ 20-0.9 MEQ/L-% SOLUTION: Performed by: INTERNAL MEDICINE

## 2018-05-30 PROCEDURE — 96372 THER/PROPH/DIAG INJ SC/IM: CPT

## 2018-05-30 PROCEDURE — 99225 PR SBSQ OBSERVATION CARE/DAY 25 MINUTES: CPT | Performed by: INTERNAL MEDICINE

## 2018-05-30 PROCEDURE — 86140 C-REACTIVE PROTEIN: CPT | Performed by: INTERNAL MEDICINE

## 2018-05-30 PROCEDURE — 73560 X-RAY EXAM OF KNEE 1 OR 2: CPT

## 2018-05-30 PROCEDURE — 63710000001 PREDNISONE PER 1 MG: Performed by: INTERNAL MEDICINE

## 2018-05-30 PROCEDURE — 25010000002 HEPARIN (PORCINE) PER 1000 UNITS: Performed by: NURSE PRACTITIONER

## 2018-05-30 PROCEDURE — 73630 X-RAY EXAM OF FOOT: CPT

## 2018-05-30 PROCEDURE — 84550 ASSAY OF BLOOD/URIC ACID: CPT | Performed by: INTERNAL MEDICINE

## 2018-05-30 PROCEDURE — 80053 COMPREHEN METABOLIC PANEL: CPT | Performed by: INTERNAL MEDICINE

## 2018-05-30 PROCEDURE — 85025 COMPLETE CBC W/AUTO DIFF WBC: CPT | Performed by: INTERNAL MEDICINE

## 2018-05-30 PROCEDURE — 73610 X-RAY EXAM OF ANKLE: CPT

## 2018-05-30 PROCEDURE — 25010000002 HYDROMORPHONE PER 4 MG: Performed by: FAMILY MEDICINE

## 2018-05-30 PROCEDURE — 96361 HYDRATE IV INFUSION ADD-ON: CPT

## 2018-05-30 RX ADMIN — COLCHICINE 0.6 MG: 0.6 TABLET, FILM COATED ORAL at 08:59

## 2018-05-30 RX ADMIN — FEBUXOSTAT 40 MG: 40 TABLET ORAL at 08:59

## 2018-05-30 RX ADMIN — LIDOCAINE 2 PATCH: 50 PATCH CUTANEOUS at 08:59

## 2018-05-30 RX ADMIN — FAMOTIDINE 20 MG: 20 TABLET ORAL at 08:59

## 2018-05-30 RX ADMIN — HYDROMORPHONE HYDROCHLORIDE 0.5 MG: 1 INJECTION, SOLUTION INTRAMUSCULAR; INTRAVENOUS; SUBCUTANEOUS at 16:56

## 2018-05-30 RX ADMIN — HEPARIN SODIUM 5000 UNITS: 5000 INJECTION, SOLUTION INTRAVENOUS; SUBCUTANEOUS at 20:18

## 2018-05-30 RX ADMIN — Medication 1 CAPSULE: at 08:59

## 2018-05-30 RX ADMIN — HEPARIN SODIUM 5000 UNITS: 5000 INJECTION, SOLUTION INTRAVENOUS; SUBCUTANEOUS at 08:59

## 2018-05-30 RX ADMIN — POTASSIUM CHLORIDE AND SODIUM CHLORIDE 75 ML/HR: 900; 150 INJECTION, SOLUTION INTRAVENOUS at 03:34

## 2018-05-30 RX ADMIN — PREDNISONE 20 MG: 20 TABLET ORAL at 08:59

## 2018-05-30 RX ADMIN — PANTOPRAZOLE SODIUM 40 MG: 40 TABLET, DELAYED RELEASE ORAL at 08:58

## 2018-05-30 RX ADMIN — FAMOTIDINE 20 MG: 20 TABLET ORAL at 20:18

## 2018-05-31 VITALS
BODY MASS INDEX: 33.57 KG/M2 | RESPIRATION RATE: 18 BRPM | HEIGHT: 75 IN | HEART RATE: 76 BPM | TEMPERATURE: 98 F | DIASTOLIC BLOOD PRESSURE: 79 MMHG | SYSTOLIC BLOOD PRESSURE: 132 MMHG | WEIGHT: 270 LBS | OXYGEN SATURATION: 98 %

## 2018-05-31 PROBLEM — E87.20 LACTIC ACIDOSIS: Status: RESOLVED | Noted: 2018-05-27 | Resolved: 2018-05-31

## 2018-05-31 PROBLEM — R10.9 ABDOMINAL PAIN: Status: RESOLVED | Noted: 2018-03-27 | Resolved: 2018-05-31

## 2018-05-31 PROBLEM — E86.0 DEHYDRATION: Status: RESOLVED | Noted: 2018-05-27 | Resolved: 2018-05-31

## 2018-05-31 PROBLEM — K52.9 GASTROENTERITIS: Status: RESOLVED | Noted: 2018-05-27 | Resolved: 2018-05-31

## 2018-05-31 LAB
O+P SPEC MICRO: NORMAL
O+P STL TRI STN: NORMAL

## 2018-05-31 PROCEDURE — 25010000002 HYDROMORPHONE PER 4 MG: Performed by: FAMILY MEDICINE

## 2018-05-31 PROCEDURE — 97530 THERAPEUTIC ACTIVITIES: CPT

## 2018-05-31 PROCEDURE — 99217 PR OBSERVATION CARE DISCHARGE MANAGEMENT: CPT | Performed by: HOSPITALIST

## 2018-05-31 PROCEDURE — 63710000001 PREDNISONE PER 1 MG: Performed by: INTERNAL MEDICINE

## 2018-05-31 PROCEDURE — 25010000002 HEPARIN (PORCINE) PER 1000 UNITS: Performed by: NURSE PRACTITIONER

## 2018-05-31 PROCEDURE — 96372 THER/PROPH/DIAG INJ SC/IM: CPT

## 2018-05-31 PROCEDURE — 96376 TX/PRO/DX INJ SAME DRUG ADON: CPT

## 2018-05-31 PROCEDURE — G0378 HOSPITAL OBSERVATION PER HR: HCPCS

## 2018-05-31 RX ORDER — ACETAMINOPHEN 500 MG
1000 TABLET ORAL 3 TIMES DAILY
Qty: 180 TABLET | Refills: 0 | Status: SHIPPED | OUTPATIENT
Start: 2018-05-31 | End: 2018-07-17

## 2018-05-31 RX ORDER — COLCHICINE 0.6 MG/1
0.6 TABLET ORAL DAILY
Qty: 30 TABLET | Refills: 0 | Status: SHIPPED | OUTPATIENT
Start: 2018-06-01 | End: 2018-07-17

## 2018-05-31 RX ORDER — PREDNISONE 10 MG/1
TABLET ORAL
Qty: 20 TABLET | Refills: 0 | Status: SHIPPED | OUTPATIENT
Start: 2018-05-31 | End: 2018-06-27

## 2018-05-31 RX ORDER — LIDOCAINE 50 MG/G
2 PATCH TOPICAL
Qty: 60 PATCH | Refills: 0 | Status: SHIPPED | OUTPATIENT
Start: 2018-06-01 | End: 2018-06-07

## 2018-05-31 RX ADMIN — COLCHICINE 0.6 MG: 0.6 TABLET, FILM COATED ORAL at 08:36

## 2018-05-31 RX ADMIN — HYDROMORPHONE HYDROCHLORIDE 0.5 MG: 1 INJECTION, SOLUTION INTRAMUSCULAR; INTRAVENOUS; SUBCUTANEOUS at 00:09

## 2018-05-31 RX ADMIN — FAMOTIDINE 20 MG: 20 TABLET ORAL at 08:36

## 2018-05-31 RX ADMIN — HEPARIN SODIUM 5000 UNITS: 5000 INJECTION, SOLUTION INTRAVENOUS; SUBCUTANEOUS at 08:36

## 2018-05-31 RX ADMIN — FEBUXOSTAT 40 MG: 40 TABLET ORAL at 08:36

## 2018-05-31 RX ADMIN — PANTOPRAZOLE SODIUM 40 MG: 40 TABLET, DELAYED RELEASE ORAL at 08:36

## 2018-05-31 RX ADMIN — LIDOCAINE 2 PATCH: 50 PATCH CUTANEOUS at 08:37

## 2018-05-31 RX ADMIN — PREDNISONE 20 MG: 20 TABLET ORAL at 08:36

## 2018-05-31 RX ADMIN — Medication 1 CAPSULE: at 08:36

## 2018-06-01 ENCOUNTER — TRANSITIONAL CARE MANAGEMENT TELEPHONE ENCOUNTER (OUTPATIENT)
Dept: INTERNAL MEDICINE | Facility: CLINIC | Age: 58
End: 2018-06-01

## 2018-06-01 LAB — BACTERIA SPEC AEROBE CULT: NORMAL

## 2018-06-01 NOTE — THERAPY DISCHARGE NOTE
Acute Care - Occupational Therapy Discharge Summary  [facility]     Patient Name: All Collins  : 1960  MRN: [id number]    Today's Date: 2018  Onset of Illness/Injury or Date of Surgery: 18    Date of Referral to OT: 18  Referring Physician: KERRI Zhu      Admit Date: 2018        OT Recommendation and Plan    Visit Dx:    ICD-10-CM ICD-9-CM   1. Hematuria, unspecified type R31.9 599.70   2. Acute idiopathic gout, unspecified site M10.00 274.01   3. Nausea and vomiting, intractability of vomiting not specified, unspecified vomiting type R11.2 787.01   4. Impaired mobility and ADLs Z74.09 799.89   5. Impaired functional mobility, balance, gait, and endurance Z74.09 V49.89   6. Generalized gouty arthritis M10.9 274.00   7. Other microscopic hematuria R31.29 599.72   8. Kidney stones N20.0 592.0                     OT Rehab Goals     Row Name 18 1300             Transfer Goal 1 (OT)    Progress/Outcome (Transfer Goal 1, OT) goal partially met;discharged from facility  -MARJ         Dressing Goal 1 (OT)    Progress/Outcome (Dressing Goal 1, OT) goal partially met;discharged from facility  -MARJ         Self-Feeding Goal 1 (OT)    Progress/Outcomes (Self-Feeding Goal 1, OT) goal met  -MARJ        User Key  (r) = Recorded By, (t) = Taken By, (c) = Cosigned By    Initials Name Provider Type Discipline    MARJ Caceres, OT Occupational Therapist OT                Outcome Measures     Row Name 18 0837 18 0920 18 1500       How much help from another person do you currently need...    Turning from your back to your side while in flat bed without using bedrails?  -- 4  -KM 3  -MARJ    Moving from lying on back to sitting on the side of a flat bed without bedrails?  -- 4  -KM 2  -MARJ    Moving to and from a bed to a chair (including a wheelchair)?  -- 1  -KM 1  -MARJ    Standing up from a chair using your arms (e.g., wheelchair, bedside chair)?  -- 2  -KM 2  -MARJ     Climbing 3-5 steps with a railing?  -- 1  -KM 1  -MARJ    To walk in hospital room?  -- 1  -KM 1  -MARJ    AM-PAC 6 Clicks Score  -- 13  -KM 10  -MARJ       How much help from another is currently needed...    Putting on and taking off regular lower body clothing? 3  -JBA  --  --    Bathing (including washing, rinsing, and drying) 2  -JBA  --  --    Toileting (which includes using toilet bed pan or urinal) 2  -JBA  --  --    Putting on and taking off regular upper body clothing 3  -JBA  --  --    Taking care of personal grooming (such as brushing teeth) 3  -JBA  --  --    Eating meals 4  -JBA  --  --    Score 17  -JBA  --  --       Functional Assessment    Outcome Measure Options AM-PAC 6 Clicks Daily Activity (OT)  -JBA AM-PAC 6 Clicks Basic Mobility (PT)  -KM AM-PAC 6 Clicks Basic Mobility (PT)  -MARJ      User Key  (r) = Recorded By, (t) = Taken By, (c) = Cosigned By    Initials Name Provider Type    MARJ Rutledge, PT Physical Therapist    SILVERIO Caceres OT Occupational Therapist     Kaylee Bangura, PT Physical Therapist          Therapy Charges for Today     Code Description Service Date Service Provider Modifiers Qty    93108566712  OT THER SUPP EA 15 MIN 5/31/2018 Enid Caceres OT GO 1    [id number]  OT THERAPEUTIC ACT EA 15 MIN 5/31/2018 Enid Caceres OT GO 1          OT Discharge Summary  Reason for Discharge: Discharge from facility  Outcomes Achieved: Patient able to partially acheive established goals  Discharge Destination: Home with assist, Home with outpatient services (outpatient PT)      Enid Caceres OT  6/1/2018

## 2018-06-06 NOTE — PROGRESS NOTES
Transitional Care Follow Up Visit  Subjective     All Collins is a 57 y.o. male who presents for a transitional care management visit.    Within 48 business hours after discharge our office contacted him via telephone to coordinate his care and needs.      I reviewed and discussed the details of that call along with the discharge summary, hospital problems, inpatient lab results, inpatient diagnostic studies, and consultation reports with All.     Current outpatient and discharge medications have been reconciled for the patient.     Date of TCM Phone Call 3/30/2018 6/1/2018   [facility]   Date of Admission 3/27/2018 5/27/2018   Date of Discharge 3/29/2018 5/31/2018   Discharge Disposition Home or Self Care Home or Self Care     Risk for Readmission (LACE) Score: 5 (5/31/2018  6:00 AM)    History of Present Illness   Course During Hospital Stay:  5/27/2018-5/31/2018. No further nausea or vomiting. Right foot pain 8.5/0-10 scale, usually worse. knee pain 7-8/0-10 scale. Worse when attempting to stand.  Rheumatology appointment today with Dr Perez.    The following portions of the patient's history were reviewed and updated as appropriate: allergies, current medications, past family history, past medical history, past social history, past surgical history and problem list.    Review of Systems   Constitutional: Negative for chills, fatigue, fever and unexpected weight change.   HENT: Negative for congestion, dental problem, mouth sores, nosebleeds, postnasal drip, rhinorrhea, sinus pain, sinus pressure, sneezing and sore throat.         Snoring.   Eyes: Negative for pain, discharge, redness and itching.   Respiratory: Negative for cough, shortness of breath and wheezing.    Cardiovascular: Negative for chest pain, palpitations and leg swelling.        No RITCHIE, orthopnea, PND, or claudication.   Gastrointestinal: Negative for abdominal distention, abdominal pain,  blood in stool, diarrhea, nausea and vomiting.   Endocrine: Negative for cold intolerance, heat intolerance, polydipsia and polyuria.   Genitourinary: Negative for difficulty urinating, dysuria, frequency, hematuria and urgency.   Musculoskeletal: Positive for arthralgias, gait problem and joint swelling. Negative for myalgias.        Tophaceous gout. Using crutches, cannot wear shoe on right foot, knee pain   Skin: Negative for color change, rash and wound.   Allergic/Immunologic: Negative.    Neurological: Negative for dizziness, syncope, weakness, light-headedness, numbness and headaches.   Hematological: Negative for adenopathy. Does not bruise/bleed easily.   Psychiatric/Behavioral: Negative.        Objective   Physical Exam   Constitutional: He is oriented to person, place, and time. He appears well-developed and well-nourished. He is cooperative. He is easily aroused.  Non-toxic appearance. He does not have a sickly appearance. He does not appear ill. No distress.   HENT:   Head: Normocephalic and atraumatic. Head is without abrasion. Hair is normal.   Right Ear: Hearing, tympanic membrane, external ear and ear canal normal. No foreign bodies. Tympanic membrane is not perforated and not erythematous.   Left Ear: Hearing, tympanic membrane, external ear and ear canal normal. No foreign bodies. Tympanic membrane is not perforated and not erythematous.   Nose: Nose normal. No mucosal edema, rhinorrhea or septal deviation. No epistaxis.  No foreign bodies.   Mouth/Throat: Oropharynx is clear and moist. No oral lesions. Normal dentition.   Eyes: Lids are normal. Pupils are equal, round, and reactive to light. Right eye exhibits no discharge. Left eye exhibits no discharge. Right conjunctiva is not injected. Left conjunctiva is not injected. No scleral icterus.   Neck: Normal range of motion and full passive range of motion without pain. Neck supple. No edema and normal range of motion present. No thyroid mass and  no thyromegaly present.   Cardiovascular: Normal rate, regular rhythm and normal heart sounds.  Exam reveals no gallop and no friction rub.    No murmur heard.  Pulmonary/Chest: Effort normal and breath sounds normal. No accessory muscle usage. He has no rhonchi. He has no rales. He exhibits no tenderness.   Abdominal: Soft. Bowel sounds are normal. He exhibits no distension. There is no hepatosplenomegaly or hepatomegaly. There is no tenderness. There is no CVA tenderness.   Musculoskeletal: He exhibits no edema.        Right hand: He exhibits decreased range of motion, tenderness and deformity.        Left hand: He exhibits decreased range of motion, bony tenderness and deformity.        Right foot: There is decreased range of motion, tenderness, swelling and deformity.   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 Wearing sock only on right foot. Using crutches.(Right foot-severe degenerative changes of the first metatarsal phalangeal joint, with severe   varus angulation and lateral subluxation of the proximal first phalanx relative   to the first metatarsal, subluxation of the fifth   metatarsophalangeal joint. Severe deformities of the second through   fourth metatarsal phalangeal joints).     Lymphadenopathy:     He has no cervical adenopathy.   Neurological: He is alert, oriented to person, place, and time and easily aroused. No cranial nerve deficit. Gait abnormal. Coordination normal.   Skin: Skin is warm, dry and intact. No abrasion and no rash noted. He is not diaphoretic. No cyanosis or erythema. Nails show no clubbing.   Psychiatric: He has a normal mood and affect. His speech is normal and behavior is normal.   Nursing note and vitals reviewed.      Assessment/Plan   All was seen today for transitional care management.    Diagnoses and all orders for this visit:    Hospital discharge follow-up    Snoring  -     Ambulatory Referral to Sleep Medicine    Generalized gouty arthritis  -     Basic  Metabolic Panel  -     CBC & Differential  -     C-reactive Protein  -     Ambulatory Referral to Physical Therapy Evaluate and treat      colchicine 0.6 MG tablet  Take 1 tablet by mouth Daily.      febuxostat (ULORIC) 40 MG tablet  Take 40 mg by mouth Daily. Total of 120mg daily      febuxostat (ULORIC) 80 MG tablet tablet  Take 80 mg by mouth Daily. Total of 120mg daily      omeprazole (priLOSEC) 20 MG capsule  Take 20 mg by mouth Daily.      ondansetron ODT (ZOFRAN ODT) 8 MG disintegrating tablet  Take 1 tablet by mouth Every 8 (Eight) Hours As Needed for Nausea or Vomiting.      predniSONE (DELTASONE) 10 MG tablet  Take 2 tabs for 4 days then 1 tab until follow up with Rheumatologist            Arranged appointment with [facility]. Dr Juarez for Monday for patient to see  for evaluation for shoe for right foot.  Discussed need for Tdap and shingles vaccine and to obtain these at pharmacy and to schedule wellness visit. Gave teaching sheet on foods to eat and avoid with gout.  Transitional Care Management Certification  I certify that the following are true:  1. Communication 6/1/2018  2. Complexity of Medical Decision Making ismoderate.  3. Face to face visit occurred within 7 days.    *Note: 54427 is for high complexity patients with a face to face visit within 7 days of discharge.  43739 is for high complexity patients with a face to face on days 8-14 post discharge or medium complexity with face to face visit within 14 days post discharge.  Return if symptoms worsen or fail to improve, wellness visit.

## 2018-06-07 ENCOUNTER — OFFICE VISIT (OUTPATIENT)
Dept: INTERNAL MEDICINE | Facility: CLINIC | Age: 58
End: 2018-06-07

## 2018-06-07 VITALS
BODY MASS INDEX: 33.57 KG/M2 | OXYGEN SATURATION: 98 % | HEART RATE: 104 BPM | SYSTOLIC BLOOD PRESSURE: 138 MMHG | HEIGHT: 75 IN | WEIGHT: 270 LBS | DIASTOLIC BLOOD PRESSURE: 88 MMHG

## 2018-06-07 DIAGNOSIS — M10.9 GENERALIZED GOUTY ARTHRITIS: ICD-10-CM

## 2018-06-07 DIAGNOSIS — R06.83 SNORING: ICD-10-CM

## 2018-06-07 DIAGNOSIS — Z09 HOSPITAL DISCHARGE FOLLOW-UP: Primary | ICD-10-CM

## 2018-06-07 LAB
BASOPHILS # BLD MANUAL: 0 10*3/MM3 (ref 0–0.2)
BASOPHILS NFR BLD MANUAL: 0 % (ref 0–1)
BUN SERPL-MCNC: 23 MG/DL (ref 9–23)
BUN/CREAT SERPL: 16.5 (ref 7–25)
CALCIUM SERPL-MCNC: 10.1 MG/DL (ref 8.7–10.4)
CHLORIDE SERPL-SCNC: 106 MMOL/L (ref 99–109)
CO2 SERPL-SCNC: 25 MMOL/L (ref 20–31)
CREAT SERPL-MCNC: 1.39 MG/DL (ref 0.6–1.3)
CRP SERPL-MCNC: 0.62 MG/DL (ref 0–1)
DIFFERENTIAL COMMENT: ABNORMAL
EOSINOPHIL # BLD MANUAL: 0.19 10*3/MM3 (ref 0.1–0.3)
EOSINOPHIL NFR BLD MANUAL: 1 % (ref 0–3)
ERYTHROCYTE [DISTWIDTH] IN BLOOD BY AUTOMATED COUNT: 15.7 % (ref 11.3–14.5)
GFR SERPLBLD CREATININE-BSD FMLA CKD-EPI: 53 ML/MIN/1.73
GFR SERPLBLD CREATININE-BSD FMLA CKD-EPI: 64 ML/MIN/1.73
GLUCOSE SERPL-MCNC: 80 MG/DL (ref 70–100)
HCT VFR BLD AUTO: 45.1 % (ref 38.9–50.9)
HGB BLD-MCNC: 13.9 G/DL (ref 13.1–17.5)
LYMPHOCYTES # BLD MANUAL: 5.28 10*3/MM3 (ref 0.6–4.8)
LYMPHOCYTES NFR BLD MANUAL: 28 % (ref 24–44)
MCH RBC QN AUTO: 26.9 PG (ref 27–31)
MCHC RBC AUTO-ENTMCNC: 30.8 G/DL (ref 32–36)
MCV RBC AUTO: 87.4 FL (ref 80–99)
MONOCYTES # BLD MANUAL: 0.94 10*3/MM3 (ref 0–1)
MONOCYTES NFR BLD MANUAL: 5 % (ref 0–12)
NEUTROPHILS # BLD MANUAL: 12.44 10*3/MM3 (ref 1.5–8.3)
NEUTROPHILS NFR BLD MANUAL: 66 % (ref 41–71)
PLATELET # BLD AUTO: 581 10*3/MM3 (ref 150–450)
PLATELET BLD QL SMEAR: ABNORMAL
POTASSIUM SERPL-SCNC: 4.2 MMOL/L (ref 3.5–5.5)
RBC # BLD AUTO: 5.16 10*6/MM3 (ref 4.2–5.76)
RBC MORPH BLD: ABNORMAL
SODIUM SERPL-SCNC: 141 MMOL/L (ref 132–146)
WBC # BLD AUTO: 18.85 10*3/MM3 (ref 3.5–10.8)

## 2018-06-07 PROCEDURE — 36415 COLL VENOUS BLD VENIPUNCTURE: CPT | Performed by: NURSE PRACTITIONER

## 2018-06-07 PROCEDURE — 99495 TRANSJ CARE MGMT MOD F2F 14D: CPT | Performed by: NURSE PRACTITIONER

## 2018-06-08 DIAGNOSIS — D72.829 LEUKOCYTOSIS, UNSPECIFIED TYPE: Primary | ICD-10-CM

## 2018-06-27 ENCOUNTER — HOSPITAL ENCOUNTER (OUTPATIENT)
Facility: HOSPITAL | Age: 58
Setting detail: OBSERVATION
Discharge: HOME | End: 2018-06-29
Attending: EMERGENCY MEDICINE | Admitting: FAMILY MEDICINE

## 2018-06-27 DIAGNOSIS — M10.09 ACUTE IDIOPATHIC GOUT OF MULTIPLE SITES: Primary | ICD-10-CM

## 2018-06-27 DIAGNOSIS — I10 ESSENTIAL HYPERTENSION: ICD-10-CM

## 2018-06-27 DIAGNOSIS — E86.0 DEHYDRATION: ICD-10-CM

## 2018-06-27 DIAGNOSIS — Z74.09 DECREASED AMBULATION STATUS: ICD-10-CM

## 2018-06-27 DIAGNOSIS — R70.0 ELEVATED SEDIMENTATION RATE: ICD-10-CM

## 2018-06-27 DIAGNOSIS — Z74.09 IMPAIRED FUNCTIONAL MOBILITY, BALANCE, GAIT, AND ENDURANCE: ICD-10-CM

## 2018-06-27 DIAGNOSIS — R11.2 NON-INTRACTABLE VOMITING WITH NAUSEA, UNSPECIFIED VOMITING TYPE: ICD-10-CM

## 2018-06-27 DIAGNOSIS — Z74.09 IMPAIRED MOBILITY AND ADLS: ICD-10-CM

## 2018-06-27 DIAGNOSIS — Z78.9 IMPAIRED MOBILITY AND ADLS: ICD-10-CM

## 2018-06-27 PROBLEM — R65.20 SIRS DUE TO INFECTIOUS PROCESS WITH ACUTE ORGAN DYSFUNCTION (HCC): Status: ACTIVE | Noted: 2018-06-27

## 2018-06-27 PROBLEM — N17.9 ACUTE KIDNEY INJURY (HCC): Status: ACTIVE | Noted: 2018-06-27

## 2018-06-27 PROBLEM — R10.84 GENERALIZED ABDOMINAL PAIN: Status: ACTIVE | Noted: 2018-05-27

## 2018-06-27 PROBLEM — R65.10 SIRS (SYSTEMIC INFLAMMATORY RESPONSE SYNDROME) (HCC): Status: ACTIVE | Noted: 2018-06-27

## 2018-06-27 PROBLEM — A41.9 SIRS DUE TO INFECTIOUS PROCESS WITH ACUTE ORGAN DYSFUNCTION (HCC): Status: ACTIVE | Noted: 2018-06-27

## 2018-06-27 PROBLEM — R19.7 NAUSEA, VOMITING AND DIARRHEA: Status: ACTIVE | Noted: 2018-03-27

## 2018-06-27 LAB
ALBUMIN SERPL-MCNC: 4.56 G/DL (ref 3.2–4.8)
ALBUMIN/GLOB SERPL: 1.2 G/DL (ref 1.5–2.5)
ALP SERPL-CCNC: 74 U/L (ref 25–100)
ALT SERPL W P-5'-P-CCNC: 16 U/L (ref 7–40)
ANION GAP SERPL CALCULATED.3IONS-SCNC: 14 MMOL/L (ref 3–11)
AST SERPL-CCNC: 18 U/L (ref 0–33)
BACTERIA UR QL AUTO: ABNORMAL /HPF
BASOPHILS # BLD AUTO: 0.07 10*3/MM3 (ref 0–0.2)
BASOPHILS NFR BLD AUTO: 0.7 % (ref 0–1)
BILIRUB SERPL-MCNC: 1.7 MG/DL (ref 0.3–1.2)
BILIRUB UR QL STRIP: ABNORMAL
BUN BLD-MCNC: 7 MG/DL (ref 9–23)
BUN/CREAT SERPL: 5.4 (ref 7–25)
CALCIUM SPEC-SCNC: 11 MG/DL (ref 8.7–10.4)
CHLORIDE SERPL-SCNC: 98 MMOL/L (ref 99–109)
CLARITY UR: CLEAR
CO2 SERPL-SCNC: 24 MMOL/L (ref 20–31)
COLOR UR: ABNORMAL
CREAT BLD-MCNC: 1.3 MG/DL (ref 0.6–1.3)
CRP SERPL-MCNC: 29.06 MG/DL (ref 0–1)
D-LACTATE SERPL-SCNC: 1.2 MMOL/L (ref 0.5–2)
DEPRECATED RDW RBC AUTO: 48.3 FL (ref 37–54)
EOSINOPHIL # BLD AUTO: 0.35 10*3/MM3 (ref 0–0.3)
EOSINOPHIL NFR BLD AUTO: 3.4 % (ref 0–3)
ERYTHROCYTE [DISTWIDTH] IN BLOOD BY AUTOMATED COUNT: 15.5 % (ref 11.3–14.5)
ERYTHROCYTE [SEDIMENTATION RATE] IN BLOOD: >130 MM/HR (ref 0–20)
GFR SERPL CREATININE-BSD FRML MDRD: 69 ML/MIN/1.73
GLOBULIN UR ELPH-MCNC: 3.8 GM/DL
GLUCOSE BLD-MCNC: 121 MG/DL (ref 70–100)
GLUCOSE UR STRIP-MCNC: NEGATIVE MG/DL
HCT VFR BLD AUTO: 44 % (ref 38.9–50.9)
HGB BLD-MCNC: 14.5 G/DL (ref 13.1–17.5)
HGB UR QL STRIP.AUTO: ABNORMAL
HYALINE CASTS UR QL AUTO: ABNORMAL /LPF
IMM GRANULOCYTES # BLD: 0.04 10*3/MM3 (ref 0–0.03)
IMM GRANULOCYTES NFR BLD: 0.4 % (ref 0–0.6)
KETONES UR QL STRIP: ABNORMAL
LEUKOCYTE ESTERASE UR QL STRIP.AUTO: ABNORMAL
LYMPHOCYTES # BLD AUTO: 4.37 10*3/MM3 (ref 0.6–4.8)
LYMPHOCYTES NFR BLD AUTO: 42.8 % (ref 24–44)
MCH RBC QN AUTO: 28 PG (ref 27–31)
MCHC RBC AUTO-ENTMCNC: 33 G/DL (ref 32–36)
MCV RBC AUTO: 84.9 FL (ref 80–99)
MONOCYTES # BLD AUTO: 1.34 10*3/MM3 (ref 0–1)
MONOCYTES NFR BLD AUTO: 13.1 % (ref 0–12)
MUCOUS THREADS URNS QL MICRO: ABNORMAL /HPF
NEUTROPHILS # BLD AUTO: 4.05 10*3/MM3 (ref 1.5–8.3)
NEUTROPHILS NFR BLD AUTO: 39.6 % (ref 41–71)
NITRITE UR QL STRIP: NEGATIVE
PH UR STRIP.AUTO: <=5 [PH] (ref 5–8)
PLATELET # BLD AUTO: 187 10*3/MM3 (ref 150–450)
PMV BLD AUTO: 9.9 FL (ref 6–12)
POTASSIUM BLD-SCNC: 3.6 MMOL/L (ref 3.5–5.5)
PROCALCITONIN SERPL-MCNC: 0.35 NG/ML
PROT SERPL-MCNC: 8.4 G/DL (ref 5.7–8.2)
PROT UR QL STRIP: ABNORMAL
RBC # BLD AUTO: 5.18 10*6/MM3 (ref 4.2–5.76)
RBC # UR: ABNORMAL /HPF
REF LAB TEST METHOD: ABNORMAL
SODIUM BLD-SCNC: 136 MMOL/L (ref 132–146)
SP GR UR STRIP: 1.02 (ref 1–1.03)
SQUAMOUS #/AREA URNS HPF: ABNORMAL /HPF
URATE SERPL-MCNC: 6.7 MG/DL (ref 3.7–9.2)
UROBILINOGEN UR QL STRIP: ABNORMAL
WBC NRBC COR # BLD: 10.22 10*3/MM3 (ref 3.5–10.8)
WBC UR QL AUTO: ABNORMAL /HPF
WHOLE BLOOD HOLD SPECIMEN: NORMAL
WHOLE BLOOD HOLD SPECIMEN: NORMAL

## 2018-06-27 PROCEDURE — 96372 THER/PROPH/DIAG INJ SC/IM: CPT

## 2018-06-27 PROCEDURE — 96376 TX/PRO/DX INJ SAME DRUG ADON: CPT

## 2018-06-27 PROCEDURE — 86140 C-REACTIVE PROTEIN: CPT | Performed by: PHYSICIAN ASSISTANT

## 2018-06-27 PROCEDURE — 80053 COMPREHEN METABOLIC PANEL: CPT | Performed by: PHYSICIAN ASSISTANT

## 2018-06-27 PROCEDURE — 85025 COMPLETE CBC W/AUTO DIFF WBC: CPT | Performed by: PHYSICIAN ASSISTANT

## 2018-06-27 PROCEDURE — 25010000002 METHYLPREDNISOLONE PER 40 MG: Performed by: PHYSICIAN ASSISTANT

## 2018-06-27 PROCEDURE — 84550 ASSAY OF BLOOD/URIC ACID: CPT | Performed by: PHYSICIAN ASSISTANT

## 2018-06-27 PROCEDURE — G0378 HOSPITAL OBSERVATION PER HR: HCPCS

## 2018-06-27 PROCEDURE — 96361 HYDRATE IV INFUSION ADD-ON: CPT

## 2018-06-27 PROCEDURE — 96375 TX/PRO/DX INJ NEW DRUG ADDON: CPT

## 2018-06-27 PROCEDURE — 81001 URINALYSIS AUTO W/SCOPE: CPT | Performed by: PHYSICIAN ASSISTANT

## 2018-06-27 PROCEDURE — 84145 PROCALCITONIN (PCT): CPT | Performed by: FAMILY MEDICINE

## 2018-06-27 PROCEDURE — 83605 ASSAY OF LACTIC ACID: CPT | Performed by: FAMILY MEDICINE

## 2018-06-27 PROCEDURE — 87040 BLOOD CULTURE FOR BACTERIA: CPT | Performed by: FAMILY MEDICINE

## 2018-06-27 PROCEDURE — 25010000002 ONDANSETRON PER 1 MG: Performed by: EMERGENCY MEDICINE

## 2018-06-27 PROCEDURE — 25010000002 HYDROMORPHONE PER 4 MG: Performed by: EMERGENCY MEDICINE

## 2018-06-27 PROCEDURE — 96374 THER/PROPH/DIAG INJ IV PUSH: CPT

## 2018-06-27 PROCEDURE — 99220 PR INITIAL OBSERVATION CARE/DAY 70 MINUTES: CPT | Performed by: FAMILY MEDICINE

## 2018-06-27 PROCEDURE — 99284 EMERGENCY DEPT VISIT MOD MDM: CPT

## 2018-06-27 PROCEDURE — 25010000002 HEPARIN (PORCINE) PER 1000 UNITS: Performed by: FAMILY MEDICINE

## 2018-06-27 PROCEDURE — 25010000002 ONDANSETRON PER 1 MG: Performed by: PHYSICIAN ASSISTANT

## 2018-06-27 PROCEDURE — 85652 RBC SED RATE AUTOMATED: CPT | Performed by: PHYSICIAN ASSISTANT

## 2018-06-27 RX ORDER — ONDANSETRON 2 MG/ML
4 INJECTION INTRAMUSCULAR; INTRAVENOUS ONCE
Status: COMPLETED | OUTPATIENT
Start: 2018-06-27 | End: 2018-06-27

## 2018-06-27 RX ORDER — PANTOPRAZOLE SODIUM 40 MG/1
40 TABLET, DELAYED RELEASE ORAL EVERY MORNING
Status: DISCONTINUED | OUTPATIENT
Start: 2018-06-28 | End: 2018-06-29 | Stop reason: HOSPADM

## 2018-06-27 RX ORDER — SODIUM CHLORIDE 0.9 % (FLUSH) 0.9 %
1-10 SYRINGE (ML) INJECTION AS NEEDED
Status: DISCONTINUED | OUTPATIENT
Start: 2018-06-27 | End: 2018-06-29 | Stop reason: HOSPADM

## 2018-06-27 RX ORDER — FEBUXOSTAT 40 MG/1
80 TABLET, FILM COATED ORAL DAILY
Status: DISCONTINUED | OUTPATIENT
Start: 2018-06-27 | End: 2018-06-28

## 2018-06-27 RX ORDER — ONDANSETRON 2 MG/ML
4 INJECTION INTRAMUSCULAR; INTRAVENOUS EVERY 6 HOURS PRN
Status: DISCONTINUED | OUTPATIENT
Start: 2018-06-27 | End: 2018-06-29 | Stop reason: HOSPADM

## 2018-06-27 RX ORDER — HYDROCODONE BITARTRATE AND ACETAMINOPHEN 5; 325 MG/1; MG/1
1 TABLET ORAL EVERY 4 HOURS PRN
Status: DISCONTINUED | OUTPATIENT
Start: 2018-06-27 | End: 2018-06-29 | Stop reason: HOSPADM

## 2018-06-27 RX ORDER — NALOXONE HCL 0.4 MG/ML
0.4 VIAL (ML) INJECTION
Status: DISCONTINUED | OUTPATIENT
Start: 2018-06-27 | End: 2018-06-28

## 2018-06-27 RX ORDER — HEPARIN SODIUM 5000 [USP'U]/ML
5000 INJECTION, SOLUTION INTRAVENOUS; SUBCUTANEOUS EVERY 12 HOURS SCHEDULED
Status: DISCONTINUED | OUTPATIENT
Start: 2018-06-27 | End: 2018-06-29 | Stop reason: HOSPADM

## 2018-06-27 RX ORDER — ACETAMINOPHEN 500 MG
1000 TABLET ORAL EVERY 8 HOURS SCHEDULED
Status: DISCONTINUED | OUTPATIENT
Start: 2018-06-27 | End: 2018-06-29 | Stop reason: HOSPADM

## 2018-06-27 RX ORDER — SODIUM CHLORIDE 9 MG/ML
125 INJECTION, SOLUTION INTRAVENOUS CONTINUOUS
Status: DISCONTINUED | OUTPATIENT
Start: 2018-06-27 | End: 2018-06-29 | Stop reason: HOSPADM

## 2018-06-27 RX ORDER — SODIUM CHLORIDE 0.9 % (FLUSH) 0.9 %
10 SYRINGE (ML) INJECTION AS NEEDED
Status: DISCONTINUED | OUTPATIENT
Start: 2018-06-27 | End: 2018-06-29 | Stop reason: HOSPADM

## 2018-06-27 RX ORDER — LORAZEPAM 1 MG/1
1 TABLET ORAL NIGHTLY PRN
Status: DISCONTINUED | OUTPATIENT
Start: 2018-06-27 | End: 2018-06-29 | Stop reason: HOSPADM

## 2018-06-27 RX ORDER — PREDNISONE 10 MG/1
10 TABLET ORAL DAILY
COMMUNITY
End: 2018-07-17

## 2018-06-27 RX ORDER — PREDNISONE 10 MG/1
10 TABLET ORAL DAILY
Status: DISCONTINUED | OUTPATIENT
Start: 2018-06-28 | End: 2018-06-29 | Stop reason: HOSPADM

## 2018-06-27 RX ORDER — METHYLPREDNISOLONE SODIUM SUCCINATE 40 MG/ML
80 INJECTION, POWDER, LYOPHILIZED, FOR SOLUTION INTRAMUSCULAR; INTRAVENOUS ONCE
Status: COMPLETED | OUTPATIENT
Start: 2018-06-27 | End: 2018-06-27

## 2018-06-27 RX ADMIN — HYDROMORPHONE HYDROCHLORIDE 0.5 MG: 1 INJECTION, SOLUTION INTRAMUSCULAR; INTRAVENOUS; SUBCUTANEOUS at 15:44

## 2018-06-27 RX ADMIN — SODIUM CHLORIDE 1000 ML: 9 INJECTION, SOLUTION INTRAVENOUS at 14:05

## 2018-06-27 RX ADMIN — FEBUXOSTAT 80 MG: 40 TABLET ORAL at 20:59

## 2018-06-27 RX ADMIN — ONDANSETRON 4 MG: 2 INJECTION, SOLUTION INTRAMUSCULAR; INTRAVENOUS at 14:04

## 2018-06-27 RX ADMIN — SODIUM CHLORIDE 1000 ML: 9 INJECTION, SOLUTION INTRAVENOUS at 15:43

## 2018-06-27 RX ADMIN — SODIUM CHLORIDE 125 ML/HR: 9 INJECTION, SOLUTION INTRAVENOUS at 21:00

## 2018-06-27 RX ADMIN — HEPARIN SODIUM 5000 UNITS: 5000 INJECTION, SOLUTION INTRAVENOUS; SUBCUTANEOUS at 20:59

## 2018-06-27 RX ADMIN — ACETAMINOPHEN 1000 MG: 500 TABLET, FILM COATED ORAL at 22:23

## 2018-06-27 RX ADMIN — ONDANSETRON 4 MG: 2 INJECTION, SOLUTION INTRAMUSCULAR; INTRAVENOUS at 15:41

## 2018-06-27 RX ADMIN — METHYLPREDNISOLONE SODIUM SUCCINATE 80 MG: 40 INJECTION, POWDER, FOR SOLUTION INTRAMUSCULAR; INTRAVENOUS at 14:06

## 2018-06-28 LAB
ANION GAP SERPL CALCULATED.3IONS-SCNC: 7 MMOL/L (ref 3–11)
BASOPHILS # BLD AUTO: 0.01 10*3/MM3 (ref 0–0.2)
BASOPHILS NFR BLD AUTO: 0.1 % (ref 0–1)
BUN BLD-MCNC: 13 MG/DL (ref 9–23)
BUN/CREAT SERPL: 11.1 (ref 7–25)
CALCIUM SPEC-SCNC: 9.2 MG/DL (ref 8.7–10.4)
CHLORIDE SERPL-SCNC: 104 MMOL/L (ref 99–109)
CO2 SERPL-SCNC: 28 MMOL/L (ref 20–31)
CREAT BLD-MCNC: 1.17 MG/DL (ref 0.6–1.3)
DEPRECATED RDW RBC AUTO: 47.4 FL (ref 37–54)
EOSINOPHIL # BLD AUTO: 0 10*3/MM3 (ref 0–0.3)
EOSINOPHIL NFR BLD AUTO: 0 % (ref 0–3)
ERYTHROCYTE [DISTWIDTH] IN BLOOD BY AUTOMATED COUNT: 15.4 % (ref 11.3–14.5)
FERRITIN SERPL-MCNC: 372 NG/ML (ref 22–322)
FOLATE SERPL-MCNC: 12.74 NG/ML (ref 3.2–20)
GFR SERPL CREATININE-BSD FRML MDRD: 78 ML/MIN/1.73
GLUCOSE BLD-MCNC: 176 MG/DL (ref 70–100)
HBA1C MFR BLD: 5.8 % (ref 4.8–5.6)
HCT VFR BLD AUTO: 39.6 % (ref 38.9–50.9)
HGB BLD-MCNC: 12.3 G/DL (ref 13.1–17.5)
IMM GRANULOCYTES # BLD: 0.03 10*3/MM3 (ref 0–0.03)
IMM GRANULOCYTES NFR BLD: 0.3 % (ref 0–0.6)
IRON 24H UR-MRATE: 12 MCG/DL (ref 50–175)
IRON SATN MFR SERPL: 5 % (ref 20–50)
LYMPHOCYTES # BLD AUTO: 0.99 10*3/MM3 (ref 0.6–4.8)
LYMPHOCYTES NFR BLD AUTO: 9.6 % (ref 24–44)
MCH RBC QN AUTO: 26.3 PG (ref 27–31)
MCHC RBC AUTO-ENTMCNC: 31.1 G/DL (ref 32–36)
MCV RBC AUTO: 84.6 FL (ref 80–99)
MONOCYTES # BLD AUTO: 0.77 10*3/MM3 (ref 0–1)
MONOCYTES NFR BLD AUTO: 7.5 % (ref 0–12)
NEUTROPHILS # BLD AUTO: 8.55 10*3/MM3 (ref 1.5–8.3)
NEUTROPHILS NFR BLD AUTO: 82.8 % (ref 41–71)
PLATELET # BLD AUTO: 176 10*3/MM3 (ref 150–450)
PMV BLD AUTO: 10 FL (ref 6–12)
POTASSIUM BLD-SCNC: 4.4 MMOL/L (ref 3.5–5.5)
RBC # BLD AUTO: 4.68 10*6/MM3 (ref 4.2–5.76)
RETICS/RBC NFR AUTO: 0.92 % (ref 0.5–1.5)
SODIUM BLD-SCNC: 139 MMOL/L (ref 132–146)
TIBC SERPL-MCNC: 241 MCG/DL (ref 250–450)
VIT B12 BLD-MCNC: 425 PG/ML (ref 211–911)
WBC NRBC COR # BLD: 10.32 10*3/MM3 (ref 3.5–10.8)

## 2018-06-28 PROCEDURE — 63710000001 PREDNISONE PER 5 MG: Performed by: FAMILY MEDICINE

## 2018-06-28 PROCEDURE — 82607 VITAMIN B-12: CPT | Performed by: HOSPITALIST

## 2018-06-28 PROCEDURE — 80048 BASIC METABOLIC PNL TOTAL CA: CPT | Performed by: FAMILY MEDICINE

## 2018-06-28 PROCEDURE — 96361 HYDRATE IV INFUSION ADD-ON: CPT

## 2018-06-28 PROCEDURE — 82728 ASSAY OF FERRITIN: CPT | Performed by: HOSPITALIST

## 2018-06-28 PROCEDURE — 83550 IRON BINDING TEST: CPT | Performed by: HOSPITALIST

## 2018-06-28 PROCEDURE — G0378 HOSPITAL OBSERVATION PER HR: HCPCS

## 2018-06-28 PROCEDURE — 25010000002 HYDROMORPHONE PER 4 MG: Performed by: HOSPITALIST

## 2018-06-28 PROCEDURE — 85025 COMPLETE CBC W/AUTO DIFF WBC: CPT | Performed by: FAMILY MEDICINE

## 2018-06-28 PROCEDURE — 83540 ASSAY OF IRON: CPT | Performed by: HOSPITALIST

## 2018-06-28 PROCEDURE — 82746 ASSAY OF FOLIC ACID SERUM: CPT | Performed by: HOSPITALIST

## 2018-06-28 PROCEDURE — 85045 AUTOMATED RETICULOCYTE COUNT: CPT | Performed by: HOSPITALIST

## 2018-06-28 PROCEDURE — 99226 PR SBSQ OBSERVATION CARE/DAY 35 MINUTES: CPT | Performed by: HOSPITALIST

## 2018-06-28 PROCEDURE — 96372 THER/PROPH/DIAG INJ SC/IM: CPT

## 2018-06-28 PROCEDURE — 83036 HEMOGLOBIN GLYCOSYLATED A1C: CPT | Performed by: FAMILY MEDICINE

## 2018-06-28 PROCEDURE — 25010000002 HEPARIN (PORCINE) PER 1000 UNITS: Performed by: FAMILY MEDICINE

## 2018-06-28 RX ORDER — COLCHICINE 0.6 MG/1
0.6 TABLET ORAL DAILY
Status: DISCONTINUED | OUTPATIENT
Start: 2018-06-28 | End: 2018-06-29 | Stop reason: HOSPADM

## 2018-06-28 RX ORDER — ALUMINA, MAGNESIA, AND SIMETHICONE 2400; 2400; 240 MG/30ML; MG/30ML; MG/30ML
15 SUSPENSION ORAL EVERY 6 HOURS PRN
Status: DISCONTINUED | OUTPATIENT
Start: 2018-06-28 | End: 2018-06-29 | Stop reason: HOSPADM

## 2018-06-28 RX ORDER — NALOXONE HCL 0.4 MG/ML
0.4 VIAL (ML) INJECTION
Status: DISCONTINUED | OUTPATIENT
Start: 2018-06-28 | End: 2018-06-29 | Stop reason: HOSPADM

## 2018-06-28 RX ADMIN — ALUMINUM HYDROXIDE, MAGNESIUM HYDROXIDE, AND DIMETHICONE 15 ML: 400; 400; 40 SUSPENSION ORAL at 14:40

## 2018-06-28 RX ADMIN — SODIUM CHLORIDE 125 ML/HR: 9 INJECTION, SOLUTION INTRAVENOUS at 04:56

## 2018-06-28 RX ADMIN — ACETAMINOPHEN 1000 MG: 500 TABLET, FILM COATED ORAL at 13:27

## 2018-06-28 RX ADMIN — ACETAMINOPHEN 1000 MG: 500 TABLET, FILM COATED ORAL at 06:20

## 2018-06-28 RX ADMIN — FEBUXOSTAT 80 MG: 40 TABLET ORAL at 08:37

## 2018-06-28 RX ADMIN — SODIUM CHLORIDE 125 ML/HR: 9 INJECTION, SOLUTION INTRAVENOUS at 21:47

## 2018-06-28 RX ADMIN — HYDROMORPHONE HYDROCHLORIDE 0.5 MG: 1 INJECTION, SOLUTION INTRAMUSCULAR; INTRAVENOUS; SUBCUTANEOUS at 21:25

## 2018-06-28 RX ADMIN — HYDROCODONE BITARTRATE AND ACETAMINOPHEN 1 TABLET: 5; 325 TABLET ORAL at 16:38

## 2018-06-28 RX ADMIN — COLCHICINE 0.6 MG: 0.6 TABLET, FILM COATED ORAL at 15:34

## 2018-06-28 RX ADMIN — HYDROCODONE BITARTRATE AND ACETAMINOPHEN 1 TABLET: 5; 325 TABLET ORAL at 06:32

## 2018-06-28 RX ADMIN — SODIUM CHLORIDE 125 ML/HR: 9 INJECTION, SOLUTION INTRAVENOUS at 13:27

## 2018-06-28 RX ADMIN — PANTOPRAZOLE SODIUM 40 MG: 40 TABLET, DELAYED RELEASE ORAL at 06:21

## 2018-06-28 RX ADMIN — ACETAMINOPHEN 1000 MG: 500 TABLET, FILM COATED ORAL at 21:14

## 2018-06-28 RX ADMIN — HEPARIN SODIUM 5000 UNITS: 5000 INJECTION, SOLUTION INTRAVENOUS; SUBCUTANEOUS at 21:14

## 2018-06-28 RX ADMIN — HEPARIN SODIUM 5000 UNITS: 5000 INJECTION, SOLUTION INTRAVENOUS; SUBCUTANEOUS at 08:38

## 2018-06-28 RX ADMIN — PREDNISONE 10 MG: 10 TABLET ORAL at 08:37

## 2018-06-29 VITALS
HEART RATE: 65 BPM | HEIGHT: 75 IN | BODY MASS INDEX: 34.15 KG/M2 | DIASTOLIC BLOOD PRESSURE: 84 MMHG | RESPIRATION RATE: 16 BRPM | OXYGEN SATURATION: 97 % | WEIGHT: 274.69 LBS | SYSTOLIC BLOOD PRESSURE: 140 MMHG | TEMPERATURE: 98.1 F

## 2018-06-29 LAB
ANION GAP SERPL CALCULATED.3IONS-SCNC: 7 MMOL/L (ref 3–11)
BASOPHILS # BLD AUTO: 0.01 10*3/MM3 (ref 0–0.2)
BASOPHILS NFR BLD AUTO: 0.1 % (ref 0–1)
BUN BLD-MCNC: 15 MG/DL (ref 9–23)
BUN/CREAT SERPL: 16 (ref 7–25)
CALCIUM SPEC-SCNC: 8.2 MG/DL (ref 8.7–10.4)
CHLORIDE SERPL-SCNC: 109 MMOL/L (ref 99–109)
CO2 SERPL-SCNC: 25 MMOL/L (ref 20–31)
CREAT BLD-MCNC: 0.94 MG/DL (ref 0.6–1.3)
DEPRECATED RDW RBC AUTO: 48.3 FL (ref 37–54)
EOSINOPHIL # BLD AUTO: 0.04 10*3/MM3 (ref 0–0.3)
EOSINOPHIL NFR BLD AUTO: 0.3 % (ref 0–3)
ERYTHROCYTE [DISTWIDTH] IN BLOOD BY AUTOMATED COUNT: 15.4 % (ref 11.3–14.5)
GFR SERPL CREATININE-BSD FRML MDRD: 100 ML/MIN/1.73
GLUCOSE BLD-MCNC: 110 MG/DL (ref 70–100)
HCT VFR BLD AUTO: 34.6 % (ref 38.9–50.9)
HGB BLD-MCNC: 10.6 G/DL (ref 13.1–17.5)
IMM GRANULOCYTES # BLD: 0.03 10*3/MM3 (ref 0–0.03)
IMM GRANULOCYTES NFR BLD: 0.2 % (ref 0–0.6)
LYMPHOCYTES # BLD AUTO: 2.16 10*3/MM3 (ref 0.6–4.8)
LYMPHOCYTES NFR BLD AUTO: 16.7 % (ref 24–44)
MCH RBC QN AUTO: 26.3 PG (ref 27–31)
MCHC RBC AUTO-ENTMCNC: 30.6 G/DL (ref 32–36)
MCV RBC AUTO: 85.9 FL (ref 80–99)
MONOCYTES # BLD AUTO: 0.76 10*3/MM3 (ref 0–1)
MONOCYTES NFR BLD AUTO: 5.9 % (ref 0–12)
NEUTROPHILS # BLD AUTO: 9.99 10*3/MM3 (ref 1.5–8.3)
NEUTROPHILS NFR BLD AUTO: 77 % (ref 41–71)
PLATELET # BLD AUTO: 203 10*3/MM3 (ref 150–450)
PMV BLD AUTO: 10.2 FL (ref 6–12)
POTASSIUM BLD-SCNC: 4.5 MMOL/L (ref 3.5–5.5)
RBC # BLD AUTO: 4.03 10*6/MM3 (ref 4.2–5.76)
SODIUM BLD-SCNC: 141 MMOL/L (ref 132–146)
WBC NRBC COR # BLD: 12.96 10*3/MM3 (ref 3.5–10.8)

## 2018-06-29 PROCEDURE — 99217 PR OBSERVATION CARE DISCHARGE MANAGEMENT: CPT | Performed by: HOSPITALIST

## 2018-06-29 PROCEDURE — G0378 HOSPITAL OBSERVATION PER HR: HCPCS

## 2018-06-29 PROCEDURE — 96361 HYDRATE IV INFUSION ADD-ON: CPT

## 2018-06-29 PROCEDURE — 97116 GAIT TRAINING THERAPY: CPT

## 2018-06-29 PROCEDURE — 25010000002 HEPARIN (PORCINE) PER 1000 UNITS: Performed by: FAMILY MEDICINE

## 2018-06-29 PROCEDURE — G8979 MOBILITY GOAL STATUS: HCPCS

## 2018-06-29 PROCEDURE — G8987 SELF CARE CURRENT STATUS: HCPCS

## 2018-06-29 PROCEDURE — 80048 BASIC METABOLIC PNL TOTAL CA: CPT | Performed by: HOSPITALIST

## 2018-06-29 PROCEDURE — 63710000001 PREDNISONE PER 5 MG: Performed by: FAMILY MEDICINE

## 2018-06-29 PROCEDURE — G8988 SELF CARE GOAL STATUS: HCPCS

## 2018-06-29 PROCEDURE — G8978 MOBILITY CURRENT STATUS: HCPCS

## 2018-06-29 PROCEDURE — 97166 OT EVAL MOD COMPLEX 45 MIN: CPT

## 2018-06-29 PROCEDURE — 96372 THER/PROPH/DIAG INJ SC/IM: CPT

## 2018-06-29 PROCEDURE — 97162 PT EVAL MOD COMPLEX 30 MIN: CPT

## 2018-06-29 PROCEDURE — 85025 COMPLETE CBC W/AUTO DIFF WBC: CPT | Performed by: HOSPITALIST

## 2018-06-29 RX ORDER — HYDROCODONE BITARTRATE AND ACETAMINOPHEN 5; 325 MG/1; MG/1
1 TABLET ORAL EVERY 6 HOURS PRN
Qty: 15 TABLET | Refills: 0 | Status: SHIPPED | OUTPATIENT
Start: 2018-06-29 | End: 2018-07-07

## 2018-06-29 RX ADMIN — ACETAMINOPHEN 1000 MG: 500 TABLET, FILM COATED ORAL at 06:03

## 2018-06-29 RX ADMIN — COLCHICINE 0.6 MG: 0.6 TABLET, FILM COATED ORAL at 09:27

## 2018-06-29 RX ADMIN — SODIUM CHLORIDE 125 ML/HR: 9 INJECTION, SOLUTION INTRAVENOUS at 12:56

## 2018-06-29 RX ADMIN — PANTOPRAZOLE SODIUM 40 MG: 40 TABLET, DELAYED RELEASE ORAL at 06:02

## 2018-06-29 RX ADMIN — HYDROCODONE BITARTRATE AND ACETAMINOPHEN 1 TABLET: 5; 325 TABLET ORAL at 09:27

## 2018-06-29 RX ADMIN — HYDROCODONE BITARTRATE AND ACETAMINOPHEN 1 TABLET: 5; 325 TABLET ORAL at 12:51

## 2018-06-29 RX ADMIN — PREDNISONE 10 MG: 10 TABLET ORAL at 09:27

## 2018-06-29 RX ADMIN — SODIUM CHLORIDE 125 ML/HR: 9 INJECTION, SOLUTION INTRAVENOUS at 05:45

## 2018-06-29 RX ADMIN — HEPARIN SODIUM 5000 UNITS: 5000 INJECTION, SOLUTION INTRAVENOUS; SUBCUTANEOUS at 09:27

## 2018-07-01 NOTE — THERAPY DISCHARGE NOTE
Acute Care - Physical Therapy Discharge Summary  [facility]     Patient Name: All Collins  : 1960  MRN: [id number]  Today's Date: 2018  Onset of Illness/Injury or Date of Surgery: 18  Date of Referral to PT: 18  Referring Physician: MD Dorota    Admit Date: 2018    Visit Dx:    ICD-10-CM ICD-9-CM   1. Acute idiopathic gout of multiple sites M10.09 274.01   2. Non-intractable vomiting with nausea, unspecified vomiting type R11.2 787.01   3. Dehydration E86.0 276.51   4. Decreased ambulation status R68.89 780.99   5. Elevated sedimentation rate R70.0 790.1   6. Essential hypertension I10 401.9   7. Impaired mobility and ADLs Z74.09 799.89   8. Impaired functional mobility, balance, gait, and endurance Z74.09 V49.89     Patient Active Problem List   Diagnosis   • Nausea, vomiting and diarrhea   • Hypokalemia   • Hematuria   • Acute idiopathic gout of multiple sites   • Generalized abdominal pain   • Dehydration   • Kidney stones   • SIRS due to infectious process with acute organ dysfunction   • Acute kidney injury   • SIRS (systemic inflammatory response syndrome)       Physical Therapy Education     Title: PT OT SLP Therapies (Resolved)     Topic: Physical Therapy (Resolved)     Point: Mobility training (Resolved)    Learning Progress Summary     Learner Status Readiness Method Response Comment Documented by    Patient Active Acceptance E,D NR   18 1330          Point: Body mechanics (Resolved)    Learning Progress Summary     Learner Status Readiness Method Response Comment Documented by    Patient Active Acceptance E,D NR   18 1330          Point: Precautions (Resolved)    Learning Progress Summary     Learner Status Readiness Method Response Comment Documented by    Patient Active Acceptance E,D NR   18 1330                      User Key     Initials Effective Dates Name Provider Type Discipline     06/19/15 -  Jocelyn Hernandez, PT Physical  Therapist PT                    PT Rehab Goals     Row Name 07/01/18 1215             Bed Mobility Goal 1 (PT)    Activity/Assistive Device (Bed Mobility Goal 1, PT) sit to supine/supine to sit  -MC      Farrell Level/Cues Needed (Bed Mobility Goal 1, PT) independent  -MC      Time Frame (Bed Mobility Goal 1, PT) 2 weeks  -MC      Progress/Outcomes (Bed Mobility Goal 1, PT) goal ongoing  -MC         Transfer Goal 1 (PT)    Activity/Assistive Device (Transfer Goal 1, PT) sit-to-stand/stand-to-sit;walker, rolling  -MC      Farrell Level/Cues Needed (Transfer Goal 1, PT) conditional independence  -MC      Time Frame (Transfer Goal 1, PT) 2 weeks  -MC      Progress/Outcome (Transfer Goal 1, PT) goal ongoing  -MC         Gait Training Goal 1 (PT)    Activity/Assistive Device (Gait Training Goal 1, PT) gait (walking locomotion);walker, rolling  -MC      Farrell Level (Gait Training Goal 1, PT) conditional independence  -MC      Distance (Gait Goal 1, PT) 200  -MC      Time Frame (Gait Training Goal 1, PT) 2 weeks  -MC      Progress/Outcome (Gait Training Goal 1, PT) goal ongoing  -MC        User Key  (r) = Recorded By, (t) = Taken By, (c) = Cosigned By    Initials Name Provider Type Discipline    MAGDALENA Grewal, PT Physical Therapist PT        Therapy Treatment         PT Recommendation and Plan  Anticipated Discharge Disposition (PT): home with assist, home with OP services  Outcome Summary/Treatment Plan (PT)  Anticipated Discharge Disposition (PT): home with assist, home with OP services          Outcome Measures     Row Name 06/29/18 1048 06/29/18 0808          How much help from another person do you currently need...    Turning from your back to your side while in flat bed without using bedrails? 3  -LS  --     Moving from lying on back to sitting on the side of a flat bed without bedrails? 3  -LS  --     Moving to and from a bed to a chair (including a wheelchair)? 3  -LS  --     Standing up  from a chair using your arms (e.g., wheelchair, bedside chair)? 3  -LS  --     Climbing 3-5 steps with a railing? 2  -LS  --     To walk in hospital room? 3  -LS  --     AM-PAC 6 Clicks Score 17  -LS  --        How much help from another is currently needed...    Putting on and taking off regular lower body clothing?  -- 2  -TB     Bathing (including washing, rinsing, and drying)  -- 2  -TB     Toileting (which includes using toilet bed pan or urinal)  -- 2  -TB     Putting on and taking off regular upper body clothing  -- 4  -TB     Taking care of personal grooming (such as brushing teeth)  -- 4  -TB     Eating meals  -- 4  -TB     Score  -- 18  -TB        Functional Assessment    Outcome Measure Options AM-PAC 6 Clicks Basic Mobility (PT)  -LS AM-PAC 6 Clicks Daily Activity (OT)  -TB       User Key  (r) = Recorded By, (t) = Taken By, (c) = Cosigned By    Initials Name Provider Type    TB She Wahl, OT Occupational Therapist    LS Jocelyn Hernandez, PT Physical Therapist           Time Calculation:     Therapy Suggested Charges     Code   Minutes Charges    28587 (CPT®) Hc Pt Neuromusc Re Education Ea 15 Min      02574 (CPT®) Hc Pt Ther Proc Ea 15 Min      76237 (CPT®) Hc Gait Training Ea 15 Min 8 1    58780 (CPT®) Hc Pt Therapeutic Act Ea 15 Min      72177 (CPT®) Hc Pt Manual Therapy Ea 15 Min      92917 (CPT®) Hc Pt Iontophoresis Ea 15 Min      44657 (CPT®) Hc Pt Elec Stim Ea-Per 15 Min      22626 (CPT®) Hc Pt Ultrasound Ea 15 Min      74530 (CPT®)  Pt Self Care/Mgmt/Train Ea 15 Min      Total  8 1              PT G-Codes  Outcome Measure Options: AM-PAC 6 Clicks Basic Mobility (PT)    PT Discharge Summary  Anticipated Discharge Disposition (PT): home with assist, home with OP services  Reason for Discharge: Discharge from facility  Outcomes Achieved: Refer to plan of care for updates on goals achieved  Discharge Destination: Home with outpatient services    Bibi Grewal, PT  7/1/2018

## 2018-07-02 ENCOUNTER — TRANSITIONAL CARE MANAGEMENT TELEPHONE ENCOUNTER (OUTPATIENT)
Dept: INTERNAL MEDICINE | Facility: CLINIC | Age: 58
End: 2018-07-02

## 2018-07-02 LAB
BACTERIA SPEC AEROBE CULT: NORMAL
BACTERIA SPEC AEROBE CULT: NORMAL

## 2018-07-02 NOTE — THERAPY DISCHARGE NOTE
Acute Care - Occupational Therapy Discharge Summary  [facility]     Patient Name: All Collins  : 1960  MRN: [id number]    Today's Date: 2018  Onset of Illness/Injury or Date of Surgery: 18    Date of Referral to OT: 18  Referring Physician: MD Dorota      Admit Date: 2018        OT Recommendation and Plan    Visit Dx:    ICD-10-CM ICD-9-CM   1. Acute idiopathic gout of multiple sites M10.09 274.01   2. Non-intractable vomiting with nausea, unspecified vomiting type R11.2 787.01   3. Dehydration E86.0 276.51   4. Decreased ambulation status R68.89 780.99   5. Elevated sedimentation rate R70.0 790.1   6. Essential hypertension I10 401.9   7. Impaired mobility and ADLs Z74.09 799.89   8. Impaired functional mobility, balance, gait, and endurance Z74.09 V49.89                     OT Rehab Goals     Row Name 18 1337             Transfer Goal 1 (OT)    Progress/Outcome (Transfer Goal 1, OT) goal not met;discharged from facility  -MARJ         Bathing Goal 1 (OT)    Progress/Outcomes (Bathing Goal 1, OT) goal not met;discharged from facility  -MARJ         Dressing Goal 1 (OT)    Progress/Outcome (Dressing Goal 1, OT) goal not met;discharged from facility  -MARJ        User Key  (r) = Recorded By, (t) = Taken By, (c) = Cosigned By    Initials Name Provider Type Discipline    MARJ Caceres OT Occupational Therapist OT              Therapy Suggested Charges     Code   Minutes Charges    None                 OT Discharge Summary  Reason for Discharge: Discharge from facility  Outcomes Achieved: Discharge from facility occurred on same date as evluation  Discharge Destination: Home with assist, Home with outpatient services (OP PT)      Enid Caceres OT  2018

## 2018-07-16 NOTE — PROGRESS NOTES
Hospital Discharge Follow Up Visit  Subjective   All Collins is a 57 y.o. male who presents for a hospital discharge follow up visit.     History of Present Illness   Patient states he missed [facility] visit after stay at [facility] from 6/27-6/29/2018. He was at [facility] in [place] after that with gout, but is unsure of the specific dates-states maybe 7/8-7/12/2018. He did not bring discharge paperwork with him today. Patient states an orthopedist at [facility] told him both knees need to be replaced.   Current pain level an 8/0-10 scale in knees and ankles. Rheumatology appointment on 7/23/2018 with Dr Nury Diana at [facility]. Urology appointment tomorrow with Dr Mccann in [place] for evaluation of kidney stones.  The following portions of the patient's history were reviewed and updated as appropriate: allergies, current medications, past family history, past medical history, past social history, past surgical history and problem list.    Review of Systems   Constitutional: Negative for chills, fatigue and fever.   HENT: Negative for congestion, dental problem, mouth sores, nosebleeds, postnasal drip, rhinorrhea, sinus pain, sinus pressure, sneezing and sore throat.    Eyes: Negative for pain, discharge, redness and itching.   Respiratory: Negative for cough, shortness of breath and wheezing.    Cardiovascular: Negative for chest pain, palpitations and leg swelling.   Gastrointestinal: Negative for abdominal distention, abdominal pain, blood in stool, diarrhea, nausea and vomiting.   Endocrine: Negative for cold intolerance, heat intolerance, polydipsia and polyuria.   Genitourinary: Negative for difficulty urinating, dysuria, frequency, hematuria and urgency.        Kidney stones   Musculoskeletal: Positive for arthralgias, gait problem and joint swelling. Negative for myalgias.        Multiple Tophi/gout since age 18   Skin: Negative for color change, rash and wound.    Allergic/Immunologic: Negative.    Neurological: Negative for dizziness, syncope, weakness, light-headedness, numbness and headaches.   Hematological: Negative for adenopathy. Does not bruise/bleed easily.   Psychiatric/Behavioral: Negative.      Objective   Physical Exam   Constitutional: He is oriented to person, place, and time. He appears well-developed and well-nourished. He is cooperative. He is easily aroused.  Non-toxic appearance. He does not have a sickly appearance. He does not appear ill. No distress.   HENT:   Head: Normocephalic and atraumatic. Head is without abrasion. Hair is normal.   Right Ear: Hearing, tympanic membrane, external ear and ear canal normal. No foreign bodies. Tympanic membrane is not perforated and not erythematous.   Left Ear: Hearing, tympanic membrane, external ear and ear canal normal. No foreign bodies. Tympanic membrane is not perforated and not erythematous.   Nose: Nose normal. No mucosal edema, rhinorrhea or septal deviation. No epistaxis.  No foreign bodies.   Mouth/Throat: Oropharynx is clear and moist. No oral lesions. Normal dentition.   Eyes: Conjunctivae and lids are normal. Pupils are equal, round, and reactive to light. Right eye exhibits no discharge. Left eye exhibits no discharge. Right conjunctiva is not injected. Left conjunctiva is not injected. No scleral icterus.   Neck: Normal range of motion and full passive range of motion without pain. Neck supple. No edema and normal range of motion present. No thyroid mass and no thyromegaly present.   Cardiovascular: Normal rate, regular rhythm and normal heart sounds.  Exam reveals no gallop and no friction rub.    No murmur heard.  Pulmonary/Chest: Effort normal and breath sounds normal. No accessory muscle usage. He has no rhonchi. He has no rales. He exhibits no tenderness.   Abdominal: Soft. Bowel sounds are normal. He exhibits no distension. There is no hepatosplenomegaly or hepatomegaly. There is no  tenderness. There is no CVA tenderness.   Musculoskeletal: He exhibits no edema or deformity.        Right knee: He exhibits decreased range of motion and bony tenderness.        Left knee: He exhibits decreased range of motion and bony tenderness. Tenderness found.   Crepitus bilateral knees. Large tophi on bilateral hands, elbows and feet. Hands and feet have deformities with multiple tophi and misalignment of fingers. Patient wearing shoes on both feet and walking without assistive devices today.   Lymphadenopathy:     He has no cervical adenopathy.   Neurological: He is alert, oriented to person, place, and time and easily aroused. No cranial nerve deficit.   Skin: Skin is warm, dry and intact. No abrasion and no rash noted. He is not diaphoretic. No cyanosis or erythema. Nails show no clubbing.   Psychiatric: He has a normal mood and affect. His speech is normal and behavior is normal.   Nursing note and vitals reviewed.      Assessment/Plan   All was seen today for follow-up.    Diagnoses and all orders for this visit:    Hospital discharge follow-up    Acute idiopathic gout of multiple sites  Continue current medications and complete rheumatology appointments/follow  Leukocytosis, unspecified type  -     CBC & Differential    Kidney stones    Complete urology referral    Discussed patient alternating between medical facilities in [facility] and surrounding areas. Discussed need to retrieve medical records and sign releases so all records can be retrieved for more efficient medical management.     Discussed dandre to update Tdap and Shingrix vaccines at pharmacy.    Return in about 1 month (around 8/17/2018), or if symptoms worsen or fail to improve, needs to schedule wellness visit.

## 2018-07-17 ENCOUNTER — OFFICE VISIT (OUTPATIENT)
Dept: INTERNAL MEDICINE | Facility: CLINIC | Age: 58
End: 2018-07-17

## 2018-07-17 VITALS
TEMPERATURE: 97.7 F | DIASTOLIC BLOOD PRESSURE: 86 MMHG | RESPIRATION RATE: 19 BRPM | BODY MASS INDEX: 33.32 KG/M2 | SYSTOLIC BLOOD PRESSURE: 123 MMHG | HEIGHT: 75 IN | WEIGHT: 268 LBS | HEART RATE: 83 BPM

## 2018-07-17 DIAGNOSIS — Z09 HOSPITAL DISCHARGE FOLLOW-UP: Primary | ICD-10-CM

## 2018-07-17 DIAGNOSIS — D72.829 LEUKOCYTOSIS, UNSPECIFIED TYPE: ICD-10-CM

## 2018-07-17 DIAGNOSIS — M10.09 ACUTE IDIOPATHIC GOUT OF MULTIPLE SITES: ICD-10-CM

## 2018-07-17 DIAGNOSIS — N20.0 KIDNEY STONES: ICD-10-CM

## 2018-07-17 PROCEDURE — 99214 OFFICE O/P EST MOD 30 MIN: CPT | Performed by: NURSE PRACTITIONER

## 2018-07-17 RX ORDER — PREDNISONE 20 MG/1
TABLET ORAL
Refills: 0 | COMMUNITY
Start: 2018-07-12 | End: 2018-07-27 | Stop reason: SDUPTHER

## 2018-07-17 RX ORDER — PANTOPRAZOLE SODIUM 40 MG/1
40 TABLET, DELAYED RELEASE ORAL 2 TIMES DAILY
Refills: 0 | COMMUNITY
Start: 2018-07-12 | End: 2018-12-13 | Stop reason: SDUPTHER

## 2018-07-17 RX ORDER — CELECOXIB 200 MG/1
200 CAPSULE ORAL DAILY
Refills: 0 | COMMUNITY
Start: 2018-07-12 | End: 2018-12-13 | Stop reason: SDUPTHER

## 2018-07-18 LAB
BASOPHILS # BLD AUTO: 0 X10E3/UL (ref 0–0.2)
BASOPHILS NFR BLD AUTO: 0 %
EOSINOPHIL # BLD AUTO: 0 X10E3/UL (ref 0–0.4)
EOSINOPHIL NFR BLD AUTO: 0 %
ERYTHROCYTE [DISTWIDTH] IN BLOOD BY AUTOMATED COUNT: 16.2 % (ref 12.3–15.4)
HCT VFR BLD AUTO: 37.6 % (ref 37.5–51)
HGB BLD-MCNC: 12 G/DL (ref 13–17.7)
IMM GRANULOCYTES # BLD: 0 X10E3/UL (ref 0–0.1)
IMM GRANULOCYTES NFR BLD: 0 %
LYMPHOCYTES # BLD AUTO: 1.7 X10E3/UL (ref 0.7–3.1)
LYMPHOCYTES NFR BLD AUTO: 13 %
MCH RBC QN AUTO: 26.5 PG (ref 26.6–33)
MCHC RBC AUTO-ENTMCNC: 31.9 G/DL (ref 31.5–35.7)
MCV RBC AUTO: 83 FL (ref 79–97)
MONOCYTES # BLD AUTO: 0.9 X10E3/UL (ref 0.1–0.9)
MONOCYTES NFR BLD AUTO: 6 %
NEUTROPHILS # BLD AUTO: 10.9 X10E3/UL (ref 1.4–7)
NEUTROPHILS NFR BLD AUTO: 81 %
PLATELET # BLD AUTO: 306 X10E3/UL (ref 150–379)
RBC # BLD AUTO: 4.52 X10E6/UL (ref 4.14–5.8)
WBC # BLD AUTO: 13.6 X10E3/UL (ref 3.4–10.8)

## 2018-07-19 ENCOUNTER — TELEPHONE (OUTPATIENT)
Dept: INTERNAL MEDICINE | Facility: CLINIC | Age: 58
End: 2018-07-19

## 2018-07-19 NOTE — TELEPHONE ENCOUNTER
----- Message from Lizett Mueller sent at 7/18/2018  8:38 AM EDT -----  PATIENT WAS CALLING IN TO TELL REPORT THE DOCTORS HE SEES OUTSIDE OF OUR PRACTICE.     RHEUMATOLOGIST HE SEES IS DR. GRIFFIN CURTIS AT [facility].     ORTHOPAEDIC MEDICINE IS DR. MECHE CHILDRESS M.D.  IN [place], [place]    ENDOCRINOLOGIST IS DR. LORI REY IN [place], [place].     PATIENT  CALL BACK IS : [phone]    THANK YOU

## 2018-07-27 ENCOUNTER — TELEPHONE (OUTPATIENT)
Dept: INTERNAL MEDICINE | Facility: CLINIC | Age: 58
End: 2018-07-27

## 2018-07-27 RX ORDER — PREDNISONE 20 MG/1
TABLET ORAL
Qty: 30 TABLET | Refills: 0 | Status: SHIPPED | OUTPATIENT
Start: 2018-07-27 | End: 2018-09-06 | Stop reason: SDUPTHER

## 2018-07-27 NOTE — TELEPHONE ENCOUNTER
----- Message from Kelly Lowe MA sent at 7/25/2018  9:45 AM EDT -----  Contact: pt  Pt needs a refill on Prednisone 10mg sent in to [facility] in [place].  Ok to wait until Thursday.  Contact pt if needed at [phone]

## 2018-07-27 NOTE — TELEPHONE ENCOUNTER
----- Message from Shelley Ferrer sent at 7/27/2018  9:28 AM EDT -----  Patient called needing rx for Prednisone 10mg sent to [facility] in [facility]  States he is not sure if Sera wants him to start back on the 10mg dose.  He can be reached at [phone]

## 2018-07-30 NOTE — TELEPHONE ENCOUNTER
Spoke with patient verbalized good understanding. Stated that he would be in touch with his other doctors to make sure that this medication was okay to continue taking.

## 2018-09-06 RX ORDER — PREDNISONE 20 MG/1
TABLET ORAL
Qty: 30 TABLET | Refills: 0 | Status: SHIPPED | OUTPATIENT
Start: 2018-09-06 | End: 2018-10-18 | Stop reason: SDUPTHER

## 2018-09-14 ENCOUNTER — TELEPHONE (OUTPATIENT)
Dept: INTERNAL MEDICINE | Facility: CLINIC | Age: 58
End: 2018-09-14

## 2018-09-14 NOTE — TELEPHONE ENCOUNTER
----- Message from Hattie Garcia sent at 9/14/2018 11:04 AM EDT -----  Concerning sleep referral, pt said he wanted to schedule, but he never called back to schedule this. I mailed him letter and he did not respond. Is this okay to cancel?

## 2018-10-18 RX ORDER — PREDNISONE 20 MG/1
TABLET ORAL
Qty: 30 TABLET | Refills: 0 | Status: SHIPPED | OUTPATIENT
Start: 2018-10-18 | End: 2018-12-13 | Stop reason: SDUPTHER

## 2018-12-05 ENCOUNTER — TELEPHONE (OUTPATIENT)
Dept: INTERNAL MEDICINE | Facility: CLINIC | Age: 58
End: 2018-12-05

## 2018-12-05 NOTE — TELEPHONE ENCOUNTER
----- Message from Valentine Churchill sent at 12/5/2018 12:46 PM EST -----  [phone]  REFILL ON ULORIC 40 AND UROLIC 80   [place]

## 2018-12-05 NOTE — TELEPHONE ENCOUNTER
Patient last visit was 7/17/2018 and no showed appointment on 9/6/2018.  Patient said he is out of medication now and needing a refill.  He scheduled an appointment for 12/10/2018.  Please advise if ok to send in refills until appointment.  Patient is requesting a call back either way.

## 2018-12-11 NOTE — TELEPHONE ENCOUNTER
Patient returned call and I informed no medication will be called in due to him being seen in [place]. Patient state he is only seen in [place] for his knee. I explained he has cancelled and no showed multiple appointments and no medication will be prescribed until he is seen in our office. He says he has a hard time finding transportation. I explained if he wants to continue getting medication from us he has to find a way to get to his appointments or he will have to find a PCP closer to his home. He verbalized understanding and confirmed his appointment for 12/13/18.

## 2018-12-13 ENCOUNTER — OFFICE VISIT (OUTPATIENT)
Dept: INTERNAL MEDICINE | Facility: CLINIC | Age: 58
End: 2018-12-13

## 2018-12-13 VITALS
TEMPERATURE: 99.2 F | HEART RATE: 76 BPM | HEIGHT: 75 IN | RESPIRATION RATE: 18 BRPM | SYSTOLIC BLOOD PRESSURE: 150 MMHG | WEIGHT: 279.2 LBS | DIASTOLIC BLOOD PRESSURE: 90 MMHG | BODY MASS INDEX: 34.71 KG/M2

## 2018-12-13 DIAGNOSIS — I10 ESSENTIAL HYPERTENSION: ICD-10-CM

## 2018-12-13 DIAGNOSIS — Z00.00 MEDICARE ANNUAL WELLNESS VISIT, INITIAL: Primary | ICD-10-CM

## 2018-12-13 DIAGNOSIS — R79.89 ABNORMAL CBC: ICD-10-CM

## 2018-12-13 DIAGNOSIS — Z13.29 SCREENING FOR ENDOCRINE DISORDER: ICD-10-CM

## 2018-12-13 DIAGNOSIS — R73.09 ELEVATED HEMOGLOBIN A1C: ICD-10-CM

## 2018-12-13 DIAGNOSIS — Z13.220 SCREENING FOR LIPOID DISORDERS: ICD-10-CM

## 2018-12-13 DIAGNOSIS — M10.09 ACUTE IDIOPATHIC GOUT OF MULTIPLE SITES: ICD-10-CM

## 2018-12-13 PROBLEM — R10.84 GENERALIZED ABDOMINAL PAIN: Status: RESOLVED | Noted: 2018-05-27 | Resolved: 2018-12-13

## 2018-12-13 PROBLEM — R19.7 NAUSEA, VOMITING AND DIARRHEA: Status: RESOLVED | Noted: 2018-03-27 | Resolved: 2018-12-13

## 2018-12-13 PROBLEM — A41.9 SIRS DUE TO INFECTIOUS PROCESS WITH ACUTE ORGAN DYSFUNCTION (HCC): Status: RESOLVED | Noted: 2018-06-27 | Resolved: 2018-12-13

## 2018-12-13 PROBLEM — R31.9 HEMATURIA: Status: RESOLVED | Noted: 2018-05-27 | Resolved: 2018-12-13

## 2018-12-13 PROBLEM — R65.20 SIRS DUE TO INFECTIOUS PROCESS WITH ACUTE ORGAN DYSFUNCTION (HCC): Status: RESOLVED | Noted: 2018-06-27 | Resolved: 2018-12-13

## 2018-12-13 PROBLEM — E86.0 DEHYDRATION: Status: RESOLVED | Noted: 2018-05-27 | Resolved: 2018-12-13

## 2018-12-13 PROBLEM — R65.10 SIRS (SYSTEMIC INFLAMMATORY RESPONSE SYNDROME) (HCC): Status: RESOLVED | Noted: 2018-06-27 | Resolved: 2018-12-13

## 2018-12-13 PROBLEM — R11.2 NAUSEA, VOMITING AND DIARRHEA: Status: RESOLVED | Noted: 2018-03-27 | Resolved: 2018-12-13

## 2018-12-13 LAB
EXPIRATION DATE: NORMAL
HBA1C MFR BLD: 5.9 %
Lab: NORMAL

## 2018-12-13 PROCEDURE — 83036 HEMOGLOBIN GLYCOSYLATED A1C: CPT | Performed by: NURSE PRACTITIONER

## 2018-12-13 PROCEDURE — 36415 COLL VENOUS BLD VENIPUNCTURE: CPT | Performed by: NURSE PRACTITIONER

## 2018-12-13 PROCEDURE — 96160 PT-FOCUSED HLTH RISK ASSMT: CPT | Performed by: NURSE PRACTITIONER

## 2018-12-13 PROCEDURE — G0438 PPPS, INITIAL VISIT: HCPCS | Performed by: NURSE PRACTITIONER

## 2018-12-13 RX ORDER — CELECOXIB 200 MG/1
200 CAPSULE ORAL DAILY
Qty: 90 CAPSULE | Refills: 1 | Status: SHIPPED | OUTPATIENT
Start: 2018-12-13 | End: 2019-11-11 | Stop reason: SDUPTHER

## 2018-12-13 RX ORDER — PREDNISONE 20 MG/1
10 TABLET ORAL DAILY
Qty: 45 TABLET | Refills: 1 | Status: SHIPPED | OUTPATIENT
Start: 2018-12-13 | End: 2019-08-27 | Stop reason: SDUPTHER

## 2018-12-13 RX ORDER — FEBUXOSTAT 40 MG/1
40 TABLET, FILM COATED ORAL DAILY
Qty: 90 TABLET | Refills: 1 | Status: SHIPPED | OUTPATIENT
Start: 2018-12-13 | End: 2019-05-03

## 2018-12-13 RX ORDER — AMLODIPINE BESYLATE 5 MG/1
5 TABLET ORAL DAILY
Qty: 30 TABLET | Refills: 1 | Status: SHIPPED | OUTPATIENT
Start: 2018-12-13 | End: 2019-05-03 | Stop reason: DRUGHIGH

## 2018-12-13 RX ORDER — FEBUXOSTAT 80 MG/1
80 TABLET, FILM COATED ORAL DAILY
Qty: 90 TABLET | Refills: 1 | Status: SHIPPED | OUTPATIENT
Start: 2018-12-13 | End: 2019-05-03

## 2018-12-13 RX ORDER — PANTOPRAZOLE SODIUM 40 MG/1
40 TABLET, DELAYED RELEASE ORAL 2 TIMES DAILY
Qty: 90 TABLET | Refills: 1 | Status: SHIPPED | OUTPATIENT
Start: 2018-12-13 | End: 2019-08-15 | Stop reason: SDUPTHER

## 2018-12-13 NOTE — PROGRESS NOTES
"QUICK REFERENCE INFORMATION:  The ABCs of the Annual Wellness Visit    Initial Medicare Wellness Visit    HEALTH RISK ASSESSMENT    1960    Recent Hospitalizations:  Recently treated at the following:  Other: [facility] [place] and rehab at [facility] in [place] for therapy 9/2018 after left knee replacement. [facility] 6/27-6/29/2018, 5/27-5/31/2018. [facility] 3/27/2018-3/29/2018. [facility] \"About 3 times\" 1/2018-3/2018.        Current Medical Providers:  Patient Care Team:  Sera Husain APRN as PCP - General (Nurse Practitioner)  Jenny Pastor MD (Rheumatology)  Katherine Paige MD as Surgeon (Orthopedic Surgery)        Smoking Status:  Social History     Tobacco Use   Smoking Status Never Smoker   Smokeless Tobacco Never Used       Alcohol Consumption:  Social History     Substance and Sexual Activity   Alcohol Use Yes   • Alcohol/week: 3.6 oz   • Types: 6 Cans of beer per week       Depression Screen:   PHQ-2/PHQ-9 Depression Screening 12/13/2018   Little interest or pleasure in doing things 0   Feeling down, depressed, or hopeless 0   Total Score 0       Health Habits and Functional and Cognitive Screening:  Functional & Cognitive Status 12/13/2018   Do you have difficulty preparing food and eating? No   Do you have difficulty bathing yourself, getting dressed or grooming yourself? No   Do you have difficulty using the toilet? No   Do you have difficulty moving around from place to place? No   Do you have trouble with steps or getting out of a bed or a chair? Yes   In the past year have you fallen or experienced a near fall? No   Current Diet Well Balanced Diet   Dental Exam Up to date   Eye Exam Not up to date   Exercise (times per week) 0 times per week   Current Exercise Activities Include None   Do you need help using the phone?  No   Are you deaf or do you have serious difficulty hearing?  No   Do you need help with transportation? No   Do you need " help shopping? No   Do you need help preparing meals?  No   Do you need help with housework?  No   Do you need help with laundry? No   Do you need help taking your medications? No   Do you need help managing money? No   Do you ever drive or ride in a car without wearing a seat belt? Yes   Have you felt unusual stress, anger or loneliness in the last month? No   Who do you live with? Other   If you need help, do you have trouble finding someone available to you? No   Have you been bothered in the last four weeks by sexual problems? No   Do you have difficulty concentrating, remembering or making decisions? No           Does the patient have evidence of cognitive impairment? No    Asiprin use counseling: Does not need ASA (and currently is not on it)      Recent Lab Results:    Visual Acuity:   Visual Acuity Screening    Right eye Left eye Both eyes   Without correction: 20/100 20/100 20/100   With correction:          Age-appropriate Screening Schedule:  Refer to the list below for future screening recommendations based on patient's age, sex and/or medical conditions. Orders for these recommended tests are listed in the plan section. The patient has been provided with a written plan.    Health Maintenance   Topic Date Due   • TDAP/TD VACCINES (1 - Tdap) 08/17/1979   • ZOSTER VACCINE (1 of 2) 08/17/2010   • COLONOSCOPY  03/01/2021   • INFLUENZA VACCINE  Addressed        Subjective   History of Present Illness  Medicare wellness and follow up.    Patient complains of reflux for years of duration (age 18). Associated with heartburn and reflux. Worsened with eating spicy foods and obesity. Managed well with Protonix.     Discussed consistent elevation of blood pressure and diagnosis of hypertension. Patient denies chest pain, neck pain, orthopnea, palpitations, peripheral edema, PND or sweats. Complicated by chronic pain, family history, sedentary lifestyle and obesity.     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 Current pain 8/0-10 scale. He takes Celebrex, Uloric and prednisone, but states nothing relieves pain. He has not followed up with rheumatology and requests a new referral.  All Collins is a 58 y.o. male who presents for an Annual Wellness Visit.    The following portions of the patient's history were reviewed and updated as appropriate: allergies, current medications, past family history, past medical history, past social history, past surgical history and problem list.    Outpatient Medications Prior to Visit   Medication Sig Dispense Refill   • celecoxib (CeleBREX) 200 MG capsule Take 200 mg by mouth Daily.  0   • febuxostat (ULORIC) 40 MG tablet Take 40 mg by mouth Daily. Total of 120mg daily     • febuxostat (ULORIC) 80 MG tablet tablet Take 80 mg by mouth Daily. Total of 120mg daily     • omeprazole (priLOSEC) 20 MG capsule Take 20 mg by mouth Daily.  0   • pantoprazole (PROTONIX) 40 MG EC tablet Take 40 mg by mouth 2 (Two) Times a Day.  0   • predniSONE (DELTASONE) 20 MG tablet take 1/2 tablet by mouth once daily 30 tablet 0     No facility-administered medications prior to visit.        Patient Active Problem List   Diagnosis   • Hypokalemia   • Acute idiopathic gout of multiple sites   • Acute kidney injury (CMS/HCC)   • Essential hypertension       Advance Care Planning:  has an advance directive - a copy has been provided and is in file    Identification of Risk Factors:  Risk factors include: weight , unhealthy diet, cardiovascular risk, inactivity, increased fall risk, chronic pain, inadequate social support, lack of transportation, vision limitations and polypharmacy.    Review of Systems   Constitutional: Negative for chills, fatigue and fever.   HENT: Negative for congestion, dental problem, mouth sores, nosebleeds, postnasal drip, rhinorrhea, sinus pressure, sinus pain, sneezing and sore throat.    Eyes: Negative for pain,  discharge, redness and itching.   Respiratory: Negative for cough, shortness of breath and wheezing.    Cardiovascular: Negative for chest pain, palpitations and leg swelling.        No RITCHIE, orthopnea, PND, or claudication.   Gastrointestinal: Negative for abdominal distention, abdominal pain, blood in stool, diarrhea, nausea and vomiting.        GERD   Endocrine: Negative for cold intolerance, heat intolerance, polydipsia and polyuria.        Elevated A1C   Genitourinary: Negative for difficulty urinating, dysuria, frequency, hematuria and urgency.   Musculoskeletal: Positive for arthralgias. Negative for gait problem, joint swelling and myalgias.        Recent left knee replacement. Tophaceous gout, chronic pain   Skin: Negative for color change, rash and wound.   Neurological: Negative for dizziness, syncope, weakness, light-headedness, numbness and headaches.   Hematological: Negative for adenopathy. Does not bruise/bleed easily.       Compared to one year ago, the patient feels his physical health is the same.  Compared to one year ago, the patient feels his mental health is better.    Objective     Physical Exam   Constitutional: He is oriented to person, place, and time. He appears well-developed and well-nourished. He is cooperative. He is easily aroused.  Non-toxic appearance. He does not have a sickly appearance. He does not appear ill. No distress.   HENT:   Head: Normocephalic and atraumatic. Head is without abrasion. Hair is normal.   Right Ear: Hearing, tympanic membrane, external ear and ear canal normal. No foreign bodies. Tympanic membrane is not perforated and not erythematous.   Left Ear: Hearing, tympanic membrane, external ear and ear canal normal. No foreign bodies. Tympanic membrane is not perforated and not erythematous.   Nose: Nose normal. No mucosal edema, rhinorrhea or septal deviation. No epistaxis.  No foreign bodies.   Mouth/Throat: Oropharynx is clear and moist. No oral lesions.  Normal dentition.   Eyes: Conjunctivae and lids are normal. Pupils are equal, round, and reactive to light. Right eye exhibits no discharge. Left eye exhibits no discharge. Right conjunctiva is not injected. Left conjunctiva is not injected. No scleral icterus.   Neck: Normal range of motion and full passive range of motion without pain. Neck supple. No edema and normal range of motion present. No thyroid mass and no thyromegaly present.   Cardiovascular: Normal rate, regular rhythm and normal heart sounds. Exam reveals no gallop and no friction rub.   No murmur heard.  Pulmonary/Chest: Effort normal and breath sounds normal. No accessory muscle usage. He has no rhonchi. He has no rales. He exhibits no tenderness.   Abdominal: Soft. Bowel sounds are normal. He exhibits no distension. There is no hepatosplenomegaly or hepatomegaly. There is no tenderness. There is no CVA tenderness.   Musculoskeletal: He exhibits no edema or tenderness.        Right shoulder: He exhibits no deformity.        Right hand: He exhibits decreased range of motion and deformity.        Left hand: He exhibits decreased range of motion and deformity.   Large tophi on bilateral hands, elbows and feet. Hands and feet have deformities with multiple tophi and misalignment of fingers. Patient wearing shoes on both feet and walking without assistive devices today.    Lymphadenopathy:     He has no cervical adenopathy.   Neurological: He is alert, oriented to person, place, and time and easily aroused. No cranial nerve deficit. Coordination normal.   Skin: Skin is warm, dry and intact. No abrasion and no rash noted. He is not diaphoretic. No cyanosis or erythema. Nails show no clubbing.   Left knee surgical scar healed well.   Psychiatric: He has a normal mood and affect. His speech is normal and behavior is normal.   Nursing note and vitals reviewed.      Vitals:    12/13/18 1457   BP: 150/90   Pulse: 76   Resp: 18   Temp: 99.2 °F (37.3 °C)  "  Weight: 127 kg (279 lb 3.2 oz)   Height: 190.5 cm (75\")   PainSc:   8       Patient's Body mass index is 34.9 kg/m². BMI is above normal parameters. Recommendations include: educational material, exercise counseling and nutrition counseling.      Assessment/Plan   Patient Self-Management and Personalized Health Advice  The patient has been provided with information about: diet, exercise, weight management, prevention of cardiac or vascular disease, the relationship between weight and GERD and fall prevention and preventive services including:   · Exercise counseling provided, Fall Risk assessment done, Fall Risk plan of care done, Influenza vaccine, Nutrition counseling provided, TdaP vaccine, Zostavax vaccine (Herpes Zoster), Hepatitis A vaccine.    Visit Diagnoses:    ICD-10-CM ICD-9-CM   1. Medicare annual wellness visit, initial Z00.00 V70.0   2. Essential hypertension I10 401.9   3. Acute idiopathic gout of multiple sites M10.09 274.01   4. Abnormal CBC R79.89 790.6   5. Elevated hemoglobin A1c R73.09 790.29   6. Screening for lipoid disorders Z13.220 V77.91   7. Screening for endocrine disorder Z13.29 V77.99       Orders Placed This Encounter   Procedures   • Lipid Panel   • T4, Free   • TSH   • Vitamin D 1,25 Dihydroxy   • SID   • Rheumatoid Factor   • Uric Acid   • Rheumatoid Factor   • C-reactive Protein   • Sedimentation Rate   • Ambulatory Referral to Rheumatology     Referral Priority:   Routine     Referral Type:   Consultation     Referral Reason:   Specialty Services Required     Requested Specialty:   Rheumatology     Number of Visits Requested:   1   • POC Glycosylated Hemoglobin (Hb A1C)   • CBC & Differential     Order Specific Question:   Manual Differential     Answer:   No       Outpatient Encounter Medications as of 12/13/2018   Medication Sig Dispense Refill   • celecoxib (CeleBREX) 200 MG capsule Take 1 capsule by mouth Daily. 90 capsule 1   • febuxostat (ULORIC) 40 MG tablet Take 1 tablet " by mouth Daily. Total of 120mg daily 90 tablet 1   • febuxostat (ULORIC) 80 MG tablet tablet Take 1 tablet by mouth Daily. Total of 120mg daily 90 tablet 1   • pantoprazole (PROTONIX) 40 MG EC tablet Take 1 tablet by mouth 2 (Two) Times a Day. 90 tablet 1   • predniSONE (DELTASONE) 20 MG tablet Take 0.5 tablets by mouth Daily. 45 tablet 1   • [DISCONTINUED] celecoxib (CeleBREX) 200 MG capsule Take 200 mg by mouth Daily.  0   • [DISCONTINUED] febuxostat (ULORIC) 40 MG tablet Take 40 mg by mouth Daily. Total of 120mg daily     • [DISCONTINUED] febuxostat (ULORIC) 80 MG tablet tablet Take 80 mg by mouth Daily. Total of 120mg daily     • [DISCONTINUED] omeprazole (priLOSEC) 20 MG capsule Take 20 mg by mouth Daily.  0   • [DISCONTINUED] pantoprazole (PROTONIX) 40 MG EC tablet Take 40 mg by mouth 2 (Two) Times a Day.  0   • [DISCONTINUED] predniSONE (DELTASONE) 20 MG tablet take 1/2 tablet by mouth once daily 30 tablet 0   • amLODIPine (NORVASC) 5 MG tablet Take 1 tablet by mouth Daily. 30 tablet 1     No facility-administered encounter medications on file as of 12/13/2018.      Counseling of diet in regards to appropriate amounts of fats, carbs, sodium and well -balanced diet.  Avoid skipping meals, appropriate amounts of h20 intake, sleep, and regular cardio activity 30 mintues 5d weekly.  The pt v/u.  Discussed need to maintain follow up appointments.  Patient to inquire about Hepatitis A, Tdap and Shingrix vaccines at pharmacy.  Reviewed use of high risk medication in the elderly: yes  Reviewed for potential of harmful drug interactions in the elderly: yes    Follow Up:  Return if symptoms worsen or fail to improve, for F/U for Subsequent Wellness in one year & a day, f/u 3 mos.     An After Visit Summary and PPPS with all of these plans were given to the patient.

## 2018-12-13 NOTE — PATIENT INSTRUCTIONS

## 2018-12-17 LAB
1,25(OH)2D3 SERPL-MCNC: 41.8 PG/ML (ref 19.9–79.3)
ANA SER QL: NEGATIVE
BASOPHILS # BLD AUTO: 0.04 10*3/MM3 (ref 0–0.2)
BASOPHILS NFR BLD AUTO: 0.4 % (ref 0–1)
CHOLEST SERPL-MCNC: 235 MG/DL (ref 0–200)
CRP SERPL-MCNC: 1.03 MG/DL (ref 0–1)
EOSINOPHIL # BLD AUTO: 0.23 10*3/MM3 (ref 0–0.3)
EOSINOPHIL NFR BLD AUTO: 2.3 % (ref 0–3)
ERYTHROCYTE [DISTWIDTH] IN BLOOD BY AUTOMATED COUNT: 18.4 % (ref 11.3–14.5)
ERYTHROCYTE [SEDIMENTATION RATE] IN BLOOD BY WESTERGREN METHOD: 55 MM/HR (ref 0–20)
HCT VFR BLD AUTO: 41.5 % (ref 38.9–50.9)
HDLC SERPL-MCNC: 72 MG/DL (ref 40–60)
HGB BLD-MCNC: 12.8 G/DL (ref 13.1–17.5)
IMM GRANULOCYTES # BLD: 0.03 10*3/MM3 (ref 0–0.03)
IMM GRANULOCYTES NFR BLD: 0.3 % (ref 0–0.6)
LDLC SERPL CALC-MCNC: 135 MG/DL (ref 0–100)
LYMPHOCYTES # BLD AUTO: 1.89 10*3/MM3 (ref 0.6–4.8)
LYMPHOCYTES NFR BLD AUTO: 18.8 % (ref 24–44)
MCH RBC QN AUTO: 26.1 PG (ref 27–31)
MCHC RBC AUTO-ENTMCNC: 30.8 G/DL (ref 32–36)
MCV RBC AUTO: 84.5 FL (ref 80–99)
MONOCYTES # BLD AUTO: 0.57 10*3/MM3 (ref 0–1)
MONOCYTES NFR BLD AUTO: 5.7 % (ref 0–12)
NEUTROPHILS # BLD AUTO: 7.27 10*3/MM3 (ref 1.5–8.3)
NEUTROPHILS NFR BLD AUTO: 72.5 % (ref 41–71)
PLATELET # BLD AUTO: 196 10*3/MM3 (ref 150–450)
RBC # BLD AUTO: 4.91 10*6/MM3 (ref 4.2–5.76)
RHEUMATOID FACT SERPL-ACNC: 12.9 IU/ML (ref 0–13.9)
T4 FREE SERPL-MCNC: 1.04 NG/DL (ref 0.89–1.76)
TRIGL SERPL-MCNC: 139 MG/DL (ref 0–150)
TSH SERPL DL<=0.005 MIU/L-ACNC: 1.91 MIU/ML (ref 0.35–5.35)
URATE SERPL-MCNC: 10.6 MG/DL (ref 3.7–9.2)
VLDLC SERPL CALC-MCNC: 27.8 MG/DL
WBC # BLD AUTO: 10.03 10*3/MM3 (ref 3.5–10.8)

## 2018-12-18 ENCOUNTER — TELEPHONE (OUTPATIENT)
Dept: INTERNAL MEDICINE | Facility: CLINIC | Age: 58
End: 2018-12-18

## 2018-12-18 DIAGNOSIS — E78.5 HYPERLIPIDEMIA, UNSPECIFIED HYPERLIPIDEMIA TYPE: Primary | ICD-10-CM

## 2018-12-18 RX ORDER — ATORVASTATIN CALCIUM 20 MG/1
20 TABLET, FILM COATED ORAL NIGHTLY
Qty: 30 TABLET | Refills: 6 | Status: SHIPPED | OUTPATIENT
Start: 2018-12-18 | End: 2019-05-07 | Stop reason: DRUGHIGH

## 2018-12-18 NOTE — TELEPHONE ENCOUNTER
----- Message from KERRI Agee sent at 12/18/2018  8:25 AM EST -----  The test results show that your A1C is in the prediabetes range. Your cholesterol is consistently elevated. I have sent medicine in to be taken nightly. Please inform me of any intolerances.Thyroid labs and vitamin D are normal. Rheumatoid factor and SID are negative. Sed rate, uric acid, sed rate and C reactive protein are elevated indicative of your gout and inflammation.  (I have mailed a letter also.)

## 2019-04-24 ENCOUNTER — TELEPHONE (OUTPATIENT)
Dept: INTERNAL MEDICINE | Facility: CLINIC | Age: 59
End: 2019-04-24

## 2019-05-03 ENCOUNTER — OFFICE VISIT (OUTPATIENT)
Dept: INTERNAL MEDICINE | Facility: CLINIC | Age: 59
End: 2019-05-03

## 2019-05-03 ENCOUNTER — HOSPITAL ENCOUNTER (OUTPATIENT)
Dept: GENERAL RADIOLOGY | Facility: HOSPITAL | Age: 59
Discharge: HOME | End: 2019-05-03
Admitting: NURSE PRACTITIONER

## 2019-05-03 VITALS
HEIGHT: 75 IN | RESPIRATION RATE: 18 BRPM | BODY MASS INDEX: 34.91 KG/M2 | TEMPERATURE: 98.1 F | HEART RATE: 75 BPM | WEIGHT: 280.8 LBS | SYSTOLIC BLOOD PRESSURE: 160 MMHG | DIASTOLIC BLOOD PRESSURE: 88 MMHG

## 2019-05-03 DIAGNOSIS — I10 ESSENTIAL HYPERTENSION: ICD-10-CM

## 2019-05-03 DIAGNOSIS — Z13.89 ENCOUNTER FOR SCREENING FOR OTHER DISORDER: ICD-10-CM

## 2019-05-03 DIAGNOSIS — K21.9 GASTROESOPHAGEAL REFLUX DISEASE WITHOUT ESOPHAGITIS: ICD-10-CM

## 2019-05-03 DIAGNOSIS — M54.41 ACUTE LOW BACK PAIN WITH RIGHT-SIDED SCIATICA, UNSPECIFIED BACK PAIN LATERALITY: Primary | ICD-10-CM

## 2019-05-03 DIAGNOSIS — E78.2 MIXED HYPERLIPIDEMIA: ICD-10-CM

## 2019-05-03 DIAGNOSIS — M1A.9XX1 CHRONIC TOPHACEOUS GOUT: ICD-10-CM

## 2019-05-03 DIAGNOSIS — E66.01 MORBIDLY OBESE (HCC): ICD-10-CM

## 2019-05-03 PROCEDURE — 99214 OFFICE O/P EST MOD 30 MIN: CPT | Performed by: NURSE PRACTITIONER

## 2019-05-03 PROCEDURE — 72100 X-RAY EXAM L-S SPINE 2/3 VWS: CPT

## 2019-05-03 RX ORDER — TIZANIDINE 2 MG/1
2 TABLET ORAL EVERY 8 HOURS PRN
Qty: 21 TABLET | Refills: 0 | Status: SHIPPED | OUTPATIENT
Start: 2019-05-03 | End: 2019-08-15 | Stop reason: SDUPTHER

## 2019-05-03 RX ORDER — AMLODIPINE BESYLATE 10 MG/1
10 TABLET ORAL DAILY
Qty: 30 TABLET | Refills: 1 | Status: SHIPPED | OUTPATIENT
Start: 2019-05-03 | End: 2019-08-15 | Stop reason: SDUPTHER

## 2019-05-03 RX ORDER — ALLOPURINOL 100 MG/1
100 TABLET ORAL DAILY
Qty: 30 TABLET | Refills: 0 | Status: SHIPPED | OUTPATIENT
Start: 2019-05-03 | End: 2019-08-15 | Stop reason: SDUPTHER

## 2019-05-04 PROBLEM — E66.01 MORBIDLY OBESE (HCC): Status: ACTIVE | Noted: 2019-05-04

## 2019-05-04 PROBLEM — N17.9 ACUTE KIDNEY INJURY (HCC): Status: RESOLVED | Noted: 2018-06-27 | Resolved: 2019-05-04

## 2019-05-04 PROBLEM — E87.6 HYPOKALEMIA: Status: RESOLVED | Noted: 2018-05-27 | Resolved: 2019-05-04

## 2019-05-04 PROBLEM — E78.2 MIXED HYPERLIPIDEMIA: Status: ACTIVE | Noted: 2019-05-04

## 2019-05-04 PROBLEM — K21.9 GASTROESOPHAGEAL REFLUX DISEASE WITHOUT ESOPHAGITIS: Status: ACTIVE | Noted: 2019-05-04

## 2019-05-04 PROBLEM — M1A.9XX1 CHRONIC TOPHACEOUS GOUT: Status: ACTIVE | Noted: 2019-05-04

## 2019-05-04 PROBLEM — M10.09 ACUTE IDIOPATHIC GOUT OF MULTIPLE SITES: Status: RESOLVED | Noted: 2018-05-27 | Resolved: 2019-05-04

## 2019-05-06 ENCOUNTER — TELEPHONE (OUTPATIENT)
Dept: INTERNAL MEDICINE | Facility: CLINIC | Age: 59
End: 2019-05-06

## 2019-05-06 LAB
ALBUMIN SERPL-MCNC: 4.5 G/DL (ref 3.5–5.2)
ALBUMIN/GLOB SERPL: 1.5 G/DL
ALP SERPL-CCNC: 71 U/L (ref 39–117)
ALT SERPL-CCNC: 20 U/L (ref 1–41)
ANA SER QL: NEGATIVE
AST SERPL-CCNC: 16 U/L (ref 1–40)
BASOPHILS # BLD AUTO: 0.11 10*3/MM3 (ref 0–0.2)
BASOPHILS NFR BLD AUTO: 1.1 % (ref 0–1.5)
BILIRUB SERPL-MCNC: 0.4 MG/DL (ref 0.2–1.2)
BUN SERPL-MCNC: 8 MG/DL (ref 6–20)
BUN/CREAT SERPL: 5.9 (ref 7–25)
CALCIUM SERPL-MCNC: 10 MG/DL (ref 8.6–10.5)
CHLORIDE SERPL-SCNC: 101 MMOL/L (ref 98–107)
CHOLEST SERPL-MCNC: 253 MG/DL (ref 0–200)
CO2 SERPL-SCNC: 22.3 MMOL/L (ref 22–29)
CREAT SERPL-MCNC: 1.35 MG/DL (ref 0.76–1.27)
CRP SERPL-MCNC: 1.73 MG/DL (ref 0–0.5)
EOSINOPHIL # BLD AUTO: 0.26 10*3/MM3 (ref 0–0.4)
EOSINOPHIL NFR BLD AUTO: 2.6 % (ref 0.3–6.2)
ERYTHROCYTE [DISTWIDTH] IN BLOOD BY AUTOMATED COUNT: 14.9 % (ref 12.3–15.4)
ERYTHROCYTE [SEDIMENTATION RATE] IN BLOOD BY WESTERGREN METHOD: 16 MM/HR (ref 0–20)
GLOBULIN SER CALC-MCNC: 3 GM/DL
GLUCOSE SERPL-MCNC: 105 MG/DL (ref 65–99)
HCT VFR BLD AUTO: 47.4 % (ref 37.5–51)
HDLC SERPL-MCNC: 36 MG/DL (ref 40–60)
HGB BLD-MCNC: 14.6 G/DL (ref 13–17.7)
IMM GRANULOCYTES # BLD AUTO: 0.06 10*3/MM3 (ref 0–0.05)
IMM GRANULOCYTES NFR BLD AUTO: 0.6 % (ref 0–0.5)
LDLC SERPL CALC-MCNC: 170 MG/DL (ref 0–100)
LYMPHOCYTES # BLD AUTO: 3.77 10*3/MM3 (ref 0.7–3.1)
LYMPHOCYTES NFR BLD AUTO: 38.2 % (ref 19.6–45.3)
MCH RBC QN AUTO: 26.6 PG (ref 26.6–33)
MCHC RBC AUTO-ENTMCNC: 30.8 G/DL (ref 31.5–35.7)
MCV RBC AUTO: 86.3 FL (ref 79–97)
MONOCYTES # BLD AUTO: 1.01 10*3/MM3 (ref 0.1–0.9)
MONOCYTES NFR BLD AUTO: 10.2 % (ref 5–12)
NEUTROPHILS # BLD AUTO: 4.67 10*3/MM3 (ref 1.7–7)
NEUTROPHILS NFR BLD AUTO: 47.3 % (ref 42.7–76)
NRBC BLD AUTO-RTO: 0.1 /100 WBC (ref 0–0.2)
PLATELET # BLD AUTO: 229 10*3/MM3 (ref 140–450)
POTASSIUM SERPL-SCNC: 3.6 MMOL/L (ref 3.5–5.2)
PROT SERPL-MCNC: 7.5 G/DL (ref 6–8.5)
RBC # BLD AUTO: 5.49 10*6/MM3 (ref 4.14–5.8)
SODIUM SERPL-SCNC: 140 MMOL/L (ref 136–145)
T4 FREE SERPL-MCNC: 1.07 NG/DL (ref 0.93–1.7)
TRIGL SERPL-MCNC: 236 MG/DL (ref 0–150)
TSH SERPL DL<=0.005 MIU/L-ACNC: 2.52 UIU/ML (ref 0.45–4.5)
URATE SERPL-MCNC: 10.7 MG/DL (ref 3.4–7)
VLDLC SERPL CALC-MCNC: 47.2 MG/DL
WBC # BLD AUTO: 9.88 10*3/MM3 (ref 3.4–10.8)

## 2019-05-07 RX ORDER — ATORVASTATIN CALCIUM 80 MG/1
80 TABLET, FILM COATED ORAL DAILY
Qty: 30 TABLET | Refills: 6 | Status: SHIPPED | OUTPATIENT
Start: 2019-05-07

## 2019-06-12 ENCOUNTER — TELEPHONE (OUTPATIENT)
Dept: INTERNAL MEDICINE | Facility: CLINIC | Age: 59
End: 2019-06-12

## 2019-06-12 NOTE — TELEPHONE ENCOUNTER
HMT notes colonoscopy 2011 at [facility].  Called to request record however no record of Colonoscopy at [facility].

## 2019-08-15 DIAGNOSIS — I10 ESSENTIAL HYPERTENSION: ICD-10-CM

## 2019-08-15 DIAGNOSIS — M54.41 ACUTE LOW BACK PAIN WITH RIGHT-SIDED SCIATICA, UNSPECIFIED BACK PAIN LATERALITY: ICD-10-CM

## 2019-08-15 DIAGNOSIS — M1A.9XX1 CHRONIC TOPHACEOUS GOUT: ICD-10-CM

## 2019-08-15 RX ORDER — ALLOPURINOL 100 MG/1
TABLET ORAL
Qty: 30 TABLET | Refills: 0 | Status: SHIPPED | OUTPATIENT
Start: 2019-08-15 | End: 2019-10-31 | Stop reason: SDUPTHER

## 2019-08-15 RX ORDER — PANTOPRAZOLE SODIUM 40 MG/1
TABLET, DELAYED RELEASE ORAL
Qty: 90 TABLET | Refills: 1 | Status: SHIPPED | OUTPATIENT
Start: 2019-08-15

## 2019-08-15 RX ORDER — TIZANIDINE 2 MG/1
TABLET ORAL
Qty: 21 TABLET | Refills: 0 | Status: SHIPPED | OUTPATIENT
Start: 2019-08-15 | End: 2019-08-30

## 2019-08-15 RX ORDER — AMLODIPINE BESYLATE 10 MG/1
TABLET ORAL
Qty: 60 TABLET | Refills: 0 | Status: SHIPPED | OUTPATIENT
Start: 2019-08-15

## 2019-08-27 RX ORDER — PREDNISONE 20 MG/1
TABLET ORAL
Qty: 45 TABLET | Refills: 1 | Status: SHIPPED | OUTPATIENT
Start: 2019-08-27

## 2019-08-30 ENCOUNTER — OFFICE VISIT (OUTPATIENT)
Dept: INTERNAL MEDICINE | Facility: CLINIC | Age: 59
End: 2019-08-30

## 2019-08-30 VITALS
SYSTOLIC BLOOD PRESSURE: 138 MMHG | HEART RATE: 105 BPM | RESPIRATION RATE: 20 BRPM | BODY MASS INDEX: 35.43 KG/M2 | DIASTOLIC BLOOD PRESSURE: 84 MMHG | HEIGHT: 75 IN | TEMPERATURE: 98.2 F | OXYGEN SATURATION: 97 % | WEIGHT: 285 LBS

## 2019-08-30 DIAGNOSIS — M51.36 DEGENERATIVE DISC DISEASE, LUMBAR: ICD-10-CM

## 2019-08-30 DIAGNOSIS — G89.29 CHRONIC RIGHT-SIDED THORACIC BACK PAIN: Primary | ICD-10-CM

## 2019-08-30 DIAGNOSIS — M54.6 CHRONIC RIGHT-SIDED THORACIC BACK PAIN: Primary | ICD-10-CM

## 2019-08-30 DIAGNOSIS — M62.830 MUSCLE SPASM OF BACK: ICD-10-CM

## 2019-08-30 PROCEDURE — 99213 OFFICE O/P EST LOW 20 MIN: CPT | Performed by: PHYSICIAN ASSISTANT

## 2019-08-30 RX ORDER — CYCLOBENZAPRINE HCL 10 MG
10 TABLET ORAL 2 TIMES DAILY PRN
Qty: 60 TABLET | Refills: 1 | Status: SHIPPED | OUTPATIENT
Start: 2019-08-30

## 2019-10-31 DIAGNOSIS — M1A.9XX1 CHRONIC TOPHACEOUS GOUT: ICD-10-CM

## 2019-10-31 RX ORDER — ALLOPURINOL 100 MG/1
100 TABLET ORAL DAILY
Qty: 30 TABLET | Refills: 2 | Status: SHIPPED | OUTPATIENT
Start: 2019-10-31

## 2019-11-11 ENCOUNTER — TELEPHONE (OUTPATIENT)
Dept: INTERNAL MEDICINE | Facility: CLINIC | Age: 59
End: 2019-11-11

## 2019-11-11 RX ORDER — CELECOXIB 200 MG/1
200 CAPSULE ORAL DAILY
Qty: 90 CAPSULE | Refills: 1 | Status: SHIPPED | OUTPATIENT
Start: 2019-11-11 | End: 2019-11-22 | Stop reason: SDUPTHER

## 2019-11-11 NOTE — TELEPHONE ENCOUNTER
PATIENT NEEDS REFILL ON  (CeleBREX) 200 MG capsule.    LEAVING TOMORROW FOR AN OUT OF TOWN TRIP.    PATIENT CONFIRMS THE [facility] IN [place].    NEED TO CALL PT? [phone]

## 2019-11-22 ENCOUNTER — TELEPHONE (OUTPATIENT)
Dept: INTERNAL MEDICINE | Facility: CLINIC | Age: 59
End: 2019-11-22

## 2019-11-22 RX ORDER — CELECOXIB 200 MG/1
200 CAPSULE ORAL DAILY
Qty: 90 CAPSULE | Refills: 1 | Status: SHIPPED | OUTPATIENT
Start: 2019-11-22

## 2019-11-22 NOTE — TELEPHONE ENCOUNTER
Patient called for a refill on the following medication:    celecoxib (CeleBREX) 200 MG capsule 90 capsule 1 11/11/2019     Sig - Route: Take 1 capsule by mouth Daily. - Oral    Class: Print    Pharmacy     [facility] [address] [facility] - [phone] [facility] [phone]